# Patient Record
Sex: FEMALE | Race: WHITE | Employment: OTHER | URBAN - METROPOLITAN AREA
[De-identification: names, ages, dates, MRNs, and addresses within clinical notes are randomized per-mention and may not be internally consistent; named-entity substitution may affect disease eponyms.]

---

## 2017-08-10 ENCOUNTER — PATIENT OUTREACH (OUTPATIENT)
Dept: FAMILY MEDICINE CLINIC | Age: 67
End: 2017-08-10

## 2018-01-26 ENCOUNTER — APPOINTMENT (OUTPATIENT)
Dept: GENERAL RADIOLOGY | Age: 68
DRG: 872 | End: 2018-01-26
Attending: NURSE PRACTITIONER
Payer: MEDICARE

## 2018-01-26 ENCOUNTER — HOSPITAL ENCOUNTER (INPATIENT)
Age: 68
LOS: 2 days | Discharge: NURSING, REHAB OR LONG-TERM CARE | DRG: 872 | End: 2018-01-28
Attending: EMERGENCY MEDICINE | Admitting: INTERNAL MEDICINE
Payer: MEDICARE

## 2018-01-26 DIAGNOSIS — R53.83 LETHARGY: ICD-10-CM

## 2018-01-26 DIAGNOSIS — J10.1 INFLUENZA A: Primary | ICD-10-CM

## 2018-01-26 PROBLEM — J11.1 INFLUENZA: Status: ACTIVE | Noted: 2018-01-26

## 2018-01-26 PROBLEM — E11.9 TYPE 2 DIABETES MELLITUS WITHOUT COMPLICATION (HCC): Chronic | Status: ACTIVE | Noted: 2018-01-26

## 2018-01-26 LAB
ALBUMIN SERPL-MCNC: 3.1 G/DL (ref 3.5–5)
ALBUMIN/GLOB SERPL: 0.7 {RATIO} (ref 1.1–2.2)
ALP SERPL-CCNC: 84 U/L (ref 45–117)
ALT SERPL-CCNC: 20 U/L (ref 12–78)
ANION GAP SERPL CALC-SCNC: 9 MMOL/L (ref 5–15)
APPEARANCE UR: CLEAR
AST SERPL-CCNC: 31 U/L (ref 15–37)
ATRIAL RATE: 93 BPM
BACTERIA URNS QL MICRO: NEGATIVE /HPF
BASOPHILS # BLD: 0 K/UL (ref 0–0.1)
BASOPHILS NFR BLD: 0 % (ref 0–1)
BILIRUB SERPL-MCNC: 0.6 MG/DL (ref 0.2–1)
BILIRUB UR QL: NEGATIVE
BUN SERPL-MCNC: 10 MG/DL (ref 6–20)
BUN/CREAT SERPL: 18 (ref 12–20)
CALCIUM SERPL-MCNC: 8.9 MG/DL (ref 8.5–10.1)
CALCULATED R AXIS, ECG10: -57 DEGREES
CALCULATED T AXIS, ECG11: 129 DEGREES
CHLORIDE SERPL-SCNC: 101 MMOL/L (ref 97–108)
CO2 SERPL-SCNC: 26 MMOL/L (ref 21–32)
COLOR UR: ABNORMAL
CREAT SERPL-MCNC: 0.56 MG/DL (ref 0.55–1.02)
DIAGNOSIS, 93000: NORMAL
DIFFERENTIAL METHOD BLD: ABNORMAL
EOSINOPHIL # BLD: 0 K/UL (ref 0–0.4)
EOSINOPHIL NFR BLD: 0 % (ref 0–7)
EPITH CASTS URNS QL MICRO: ABNORMAL /LPF
ERYTHROCYTE [DISTWIDTH] IN BLOOD BY AUTOMATED COUNT: 14.8 % (ref 11.5–14.5)
FLUAV AG NPH QL IA: POSITIVE
FLUBV AG NOSE QL IA: NEGATIVE
GLOBULIN SER CALC-MCNC: 4.3 G/DL (ref 2–4)
GLUCOSE BLD STRIP.AUTO-MCNC: 198 MG/DL (ref 65–100)
GLUCOSE SERPL-MCNC: 273 MG/DL (ref 65–100)
GLUCOSE UR STRIP.AUTO-MCNC: 500 MG/DL
HCT VFR BLD AUTO: 32.9 % (ref 35–47)
HGB BLD-MCNC: 10.6 G/DL (ref 11.5–16)
HGB UR QL STRIP: ABNORMAL
HYALINE CASTS URNS QL MICRO: ABNORMAL /LPF (ref 0–5)
IMM GRANULOCYTES # BLD: 0.1 K/UL (ref 0–0.04)
IMM GRANULOCYTES NFR BLD AUTO: 1 % (ref 0–0.5)
KETONES UR QL STRIP.AUTO: ABNORMAL MG/DL
LEUKOCYTE ESTERASE UR QL STRIP.AUTO: NEGATIVE
LYMPHOCYTES # BLD: 0.6 K/UL (ref 0.8–3.5)
LYMPHOCYTES NFR BLD: 5 % (ref 12–49)
MCH RBC QN AUTO: 28.9 PG (ref 26–34)
MCHC RBC AUTO-ENTMCNC: 32.2 G/DL (ref 30–36.5)
MCV RBC AUTO: 89.6 FL (ref 80–99)
MONOCYTES # BLD: 0.5 K/UL (ref 0–1)
MONOCYTES NFR BLD: 4 % (ref 5–13)
NEUTS SEG # BLD: 10.5 K/UL (ref 1.8–8)
NEUTS SEG NFR BLD: 90 % (ref 32–75)
NITRITE UR QL STRIP.AUTO: NEGATIVE
NRBC # BLD: 0 K/UL (ref 0–0.01)
NRBC BLD-RTO: 0 PER 100 WBC
P-R INTERVAL, ECG05: 168 MS
PH UR STRIP: 6 [PH] (ref 5–8)
PLATELET # BLD AUTO: 236 K/UL (ref 150–400)
PMV BLD AUTO: 10.7 FL (ref 8.9–12.9)
POTASSIUM SERPL-SCNC: 4.8 MMOL/L (ref 3.5–5.1)
PROT SERPL-MCNC: 7.4 G/DL (ref 6.4–8.2)
PROT UR STRIP-MCNC: 30 MG/DL
Q-T INTERVAL, ECG07: 382 MS
QRS DURATION, ECG06: 102 MS
QTC CALCULATION (BEZET), ECG08: 474 MS
RBC # BLD AUTO: 3.67 M/UL (ref 3.8–5.2)
RBC #/AREA URNS HPF: ABNORMAL /HPF (ref 0–5)
RBC MORPH BLD: ABNORMAL
SERVICE CMNT-IMP: ABNORMAL
SODIUM SERPL-SCNC: 136 MMOL/L (ref 136–145)
SP GR UR REFRACTOMETRY: 1.02 (ref 1–1.03)
UR CULT HOLD, URHOLD: NORMAL
UROBILINOGEN UR QL STRIP.AUTO: 1 EU/DL (ref 0.2–1)
VENTRICULAR RATE, ECG03: 93 BPM
WBC # BLD AUTO: 11.7 K/UL (ref 3.6–11)
WBC URNS QL MICRO: ABNORMAL /HPF (ref 0–4)

## 2018-01-26 PROCEDURE — 80053 COMPREHEN METABOLIC PANEL: CPT | Performed by: NURSE PRACTITIONER

## 2018-01-26 PROCEDURE — 74011250636 HC RX REV CODE- 250/636: Performed by: NURSE PRACTITIONER

## 2018-01-26 PROCEDURE — 65270000029 HC RM PRIVATE

## 2018-01-26 PROCEDURE — 93005 ELECTROCARDIOGRAM TRACING: CPT

## 2018-01-26 PROCEDURE — 71045 X-RAY EXAM CHEST 1 VIEW: CPT

## 2018-01-26 PROCEDURE — 74011250636 HC RX REV CODE- 250/636: Performed by: INTERNAL MEDICINE

## 2018-01-26 PROCEDURE — 51701 INSERT BLADDER CATHETER: CPT

## 2018-01-26 PROCEDURE — 74011250637 HC RX REV CODE- 250/637: Performed by: INTERNAL MEDICINE

## 2018-01-26 PROCEDURE — 87804 INFLUENZA ASSAY W/OPTIC: CPT | Performed by: NURSE PRACTITIONER

## 2018-01-26 PROCEDURE — 36415 COLL VENOUS BLD VENIPUNCTURE: CPT | Performed by: NURSE PRACTITIONER

## 2018-01-26 PROCEDURE — 74011250637 HC RX REV CODE- 250/637: Performed by: NURSE PRACTITIONER

## 2018-01-26 PROCEDURE — 77030038269 HC DRN EXT URIN PURWCK BARD -A

## 2018-01-26 PROCEDURE — 85025 COMPLETE CBC W/AUTO DIFF WBC: CPT | Performed by: NURSE PRACTITIONER

## 2018-01-26 PROCEDURE — 99285 EMERGENCY DEPT VISIT HI MDM: CPT

## 2018-01-26 PROCEDURE — 81001 URINALYSIS AUTO W/SCOPE: CPT | Performed by: NURSE PRACTITIONER

## 2018-01-26 PROCEDURE — 77030011943

## 2018-01-26 PROCEDURE — 82962 GLUCOSE BLOOD TEST: CPT

## 2018-01-26 PROCEDURE — 96360 HYDRATION IV INFUSION INIT: CPT

## 2018-01-26 PROCEDURE — 74011636637 HC RX REV CODE- 636/637: Performed by: INTERNAL MEDICINE

## 2018-01-26 RX ORDER — ATORVASTATIN CALCIUM 20 MG/1
40 TABLET, FILM COATED ORAL
Status: DISCONTINUED | OUTPATIENT
Start: 2018-01-26 | End: 2018-01-28 | Stop reason: HOSPADM

## 2018-01-26 RX ORDER — ACETAMINOPHEN 325 MG/1
650 TABLET ORAL
Status: DISCONTINUED | OUTPATIENT
Start: 2018-01-26 | End: 2018-01-28 | Stop reason: HOSPADM

## 2018-01-26 RX ORDER — METFORMIN HYDROCHLORIDE 1000 MG/1
1000 TABLET ORAL 2 TIMES DAILY WITH MEALS
COMMUNITY

## 2018-01-26 RX ORDER — MEMANTINE HYDROCHLORIDE 10 MG/1
5 TABLET ORAL
Status: DISCONTINUED | OUTPATIENT
Start: 2018-01-26 | End: 2018-01-28 | Stop reason: HOSPADM

## 2018-01-26 RX ORDER — MEMANTINE HYDROCHLORIDE 10 MG/1
10 TABLET ORAL DAILY
COMMUNITY
Start: 2018-01-23 | End: 2018-01-29

## 2018-01-26 RX ORDER — INSULIN GLARGINE 100 [IU]/ML
10 INJECTION, SOLUTION SUBCUTANEOUS
Status: DISCONTINUED | OUTPATIENT
Start: 2018-01-26 | End: 2018-01-28 | Stop reason: HOSPADM

## 2018-01-26 RX ORDER — LISINOPRIL 5 MG/1
2.5 TABLET ORAL DAILY
Status: DISCONTINUED | OUTPATIENT
Start: 2018-01-27 | End: 2018-01-27

## 2018-01-26 RX ORDER — OXYCODONE AND ACETAMINOPHEN 5; 325 MG/1; MG/1
1 TABLET ORAL
Status: DISCONTINUED | OUTPATIENT
Start: 2018-01-26 | End: 2018-01-27

## 2018-01-26 RX ORDER — SODIUM CHLORIDE 0.9 % (FLUSH) 0.9 %
5-10 SYRINGE (ML) INJECTION AS NEEDED
Status: DISCONTINUED | OUTPATIENT
Start: 2018-01-26 | End: 2018-01-28 | Stop reason: HOSPADM

## 2018-01-26 RX ORDER — CARVEDILOL 3.12 MG/1
3.12 TABLET ORAL 2 TIMES DAILY WITH MEALS
Status: DISCONTINUED | OUTPATIENT
Start: 2018-01-27 | End: 2018-01-28 | Stop reason: HOSPADM

## 2018-01-26 RX ORDER — OSELTAMIVIR PHOSPHATE 75 MG/1
75 CAPSULE ORAL EVERY 12 HOURS
Status: DISCONTINUED | OUTPATIENT
Start: 2018-01-27 | End: 2018-01-28 | Stop reason: HOSPADM

## 2018-01-26 RX ORDER — SODIUM CHLORIDE 0.9 % (FLUSH) 0.9 %
5-10 SYRINGE (ML) INJECTION EVERY 8 HOURS
Status: DISCONTINUED | OUTPATIENT
Start: 2018-01-26 | End: 2018-01-28 | Stop reason: HOSPADM

## 2018-01-26 RX ORDER — KETOROLAC TROMETHAMINE 30 MG/ML
15 INJECTION, SOLUTION INTRAMUSCULAR; INTRAVENOUS
Status: DISCONTINUED | OUTPATIENT
Start: 2018-01-26 | End: 2018-01-26

## 2018-01-26 RX ORDER — CARVEDILOL 3.12 MG/1
3.12 TABLET ORAL 2 TIMES DAILY WITH MEALS
COMMUNITY

## 2018-01-26 RX ORDER — MEMANTINE HYDROCHLORIDE 10 MG/1
10 TABLET ORAL 2 TIMES DAILY
COMMUNITY
Start: 2018-01-30

## 2018-01-26 RX ORDER — DICLOFENAC SODIUM 10 MG/G
2 GEL TOPICAL DAILY
COMMUNITY

## 2018-01-26 RX ORDER — SODIUM CHLORIDE 9 MG/ML
75 INJECTION, SOLUTION INTRAVENOUS CONTINUOUS
Status: DISCONTINUED | OUTPATIENT
Start: 2018-01-26 | End: 2018-01-27

## 2018-01-26 RX ORDER — PROCHLORPERAZINE EDISYLATE 5 MG/ML
10 INJECTION INTRAMUSCULAR; INTRAVENOUS
Status: DISCONTINUED | OUTPATIENT
Start: 2018-01-26 | End: 2018-01-28 | Stop reason: HOSPADM

## 2018-01-26 RX ORDER — ACETAMINOPHEN 650 MG/1
975 SUPPOSITORY RECTAL
Status: COMPLETED | OUTPATIENT
Start: 2018-01-26 | End: 2018-01-26

## 2018-01-26 RX ORDER — HYDROMORPHONE HYDROCHLORIDE 2 MG/ML
0.5 INJECTION, SOLUTION INTRAMUSCULAR; INTRAVENOUS; SUBCUTANEOUS
Status: DISCONTINUED | OUTPATIENT
Start: 2018-01-26 | End: 2018-01-27

## 2018-01-26 RX ORDER — POTASSIUM CHLORIDE 750 MG/1
10 TABLET, FILM COATED, EXTENDED RELEASE ORAL
COMMUNITY

## 2018-01-26 RX ORDER — SCOLOPAMINE TRANSDERMAL SYSTEM 1 MG/1
1 PATCH, EXTENDED RELEASE TRANSDERMAL
Status: DISCONTINUED | OUTPATIENT
Start: 2018-01-26 | End: 2018-01-27

## 2018-01-26 RX ORDER — ONDANSETRON 4 MG/1
4 TABLET, FILM COATED ORAL
COMMUNITY

## 2018-01-26 RX ORDER — ATORVASTATIN CALCIUM 40 MG/1
40 TABLET, FILM COATED ORAL
COMMUNITY

## 2018-01-26 RX ORDER — MEMANTINE HYDROCHLORIDE 5 MG/1
5 TABLET ORAL
COMMUNITY
Start: 2018-01-23 | End: 2018-01-29

## 2018-01-26 RX ORDER — LISINOPRIL 2.5 MG/1
2.5 TABLET ORAL DAILY
COMMUNITY

## 2018-01-26 RX ORDER — OSELTAMIVIR PHOSPHATE 6 MG/ML
75 FOR SUSPENSION ORAL
Status: COMPLETED | OUTPATIENT
Start: 2018-01-26 | End: 2018-01-26

## 2018-01-26 RX ORDER — SCOLOPAMINE TRANSDERMAL SYSTEM 1 MG/1
1 PATCH, EXTENDED RELEASE TRANSDERMAL
COMMUNITY
End: 2018-01-28

## 2018-01-26 RX ORDER — INSULIN DEGLUDEC 100 U/ML
14 INJECTION, SOLUTION SUBCUTANEOUS
COMMUNITY

## 2018-01-26 RX ORDER — MEMANTINE HYDROCHLORIDE 10 MG/1
10 TABLET ORAL 2 TIMES DAILY
Status: DISCONTINUED | OUTPATIENT
Start: 2018-01-30 | End: 2018-01-28 | Stop reason: HOSPADM

## 2018-01-26 RX ORDER — MEMANTINE HYDROCHLORIDE 10 MG/1
10 TABLET ORAL DAILY
Status: DISCONTINUED | OUTPATIENT
Start: 2018-01-27 | End: 2018-01-28 | Stop reason: HOSPADM

## 2018-01-26 RX ADMIN — INSULIN GLARGINE 10 UNITS: 100 INJECTION, SOLUTION SUBCUTANEOUS at 21:50

## 2018-01-26 RX ADMIN — ACETAMINOPHEN 975 MG: 650 SUPPOSITORY RECTAL at 15:23

## 2018-01-26 RX ADMIN — Medication 10 ML: at 21:52

## 2018-01-26 RX ADMIN — SODIUM CHLORIDE 1000 ML: 9 INJECTION, SOLUTION INTRAVENOUS at 15:23

## 2018-01-26 RX ADMIN — MEMANTINE HYDROCHLORIDE 5 MG: 10 TABLET, FILM COATED ORAL at 21:51

## 2018-01-26 RX ADMIN — OSELTAMIVIR PHOSPHATE 75 MG: 6 POWDER, FOR SUSPENSION ORAL at 16:41

## 2018-01-26 RX ADMIN — ATORVASTATIN CALCIUM 40 MG: 20 TABLET, FILM COATED ORAL at 21:51

## 2018-01-26 RX ADMIN — SODIUM CHLORIDE 75 ML/HR: 9 INJECTION, SOLUTION INTRAVENOUS at 20:03

## 2018-01-26 NOTE — PROGRESS NOTES
[facility]: MED RECONCILIATION    Comments/Recommendations:    Med rec completed with call to [facility] and spoke with RN regarding medications. She states patient had AM meds today but vomited twice so she is unsure if patient actually received morning meds.  Patient is currently on a namenda taper. Patient is to take 10 mg q AM, 5 mg q PM from - then increase to 10 mg po BID on     Information obtained from: [facility] med list    Significant PMH/Disease States:   Past Medical History:   Diagnosis Date    Atrial fibrillation ([address]     discovered 3/1/15; spontaneously converted to NSR    C. difficile colitis     Cardiomyopathy     long history of cardiomyopathy     COPD     Depression     [facility]     DVT (deep venous thrombosis) ([facility]     Bilat; s/p IVC filter     H/O abdominal surgery     ?  Hypertension     Incontinence     CAM (obstructive sleep apnea)     not on CPAP    Prolonged QT interval 3/1/15     on 3/1/15     Seasonal allergies     Stroke [facility])     ; right sided weakness and aphasia (after major abd surgery)        Chief Complaint for this Admission:   Chief Complaint   Patient presents with    Vomiting    Lethargy       Allergies: Ciprofloxacin and Cleocin [clindamycin hcl]    Prior to Admission Medications:     Prior to Admission Medications   Prescriptions Last Dose Informant Patient Reported? Taking?   atorvastatin (LIPITOR) 40 mg tablet 2018 at PM Transfer Papers Yes Yes   Sig: Take 40 mg by mouth nightly. carvedilol (COREG) 3.125 mg tablet 2018 at AM Transfer Papers Yes Yes   Sig: Take 3.125 mg by mouth two (2) times daily (with meals). diclofenac (VOLTAREN) 1 % gel 2018 at AM Transfer Papers Yes Yes   Sig: Apply 2 g to affected area daily.  To right leg and right hip   insulin degludec (TRESIBA FLEXTOUCH U-100) 100 unit/mL (3 mL) inpn 2018 at PM Transfer Papers Yes Yes   Si Units by SubCUTAneous route nightly. lisinopril (PRINIVIL, ZESTRIL) 2.5 mg tablet 2018 at AM Transfer Papers Yes Yes   Sig: Take 2.5 mg by mouth daily. memantine (NAMENDA) 10 mg tablet  Transfer Papers Yes Yes   Sig: Take 10 mg by mouth two (2) times a day. Starting 18   memantine (NAMENDA) 10 mg tablet 2018 at AM Transfer Papers Yes Yes   Sig: Take 10 mg by mouth daily. 10 mg q AM, 5 mg q PM from - then increase to 10 mg po BID on    memantine (NAMENDA) 5 mg tablet 2018 at PM Transfer Papers Yes Yes   Sig: Take 5 mg by mouth nightly. 10 mg q AM, 5 mg q PM from - then increase to 10 mg po BID on    metFORMIN (GLUCOPHAGE) 1,000 mg tablet 2018 at AM Transfer Papers Yes Yes   Sig: Take 1,000 mg by mouth two (2) times daily (with meals). ondansetron hcl (ZOFRAN) 4 mg tablet  Transfer Papers Yes Yes   Sig: Take 4 mg by mouth every four (4) hours as needed for Nausea. potassium chloride SR (KLOR-CON 10) 10 mEq tablet 2018 at AM Transfer Papers Yes Yes   Sig: Take 10 mEq by mouth daily (with breakfast). rivaroxaban (XARELTO) 20 mg tab tablet 2018 at AM Transfer Papers Yes Yes   Sig: Take 20 mg by mouth daily (with breakfast). scopolamine (TRANSDERM-SCOP) 1 mg pt3d  Transfer Papers Yes Yes   Si Patch by TransDERmal route every seventy-two (72) hours.       Facility-Administered Medications: None         SAMI Garcia   Contact: [phone]

## 2018-01-26 NOTE — IP AVS SNAPSHOT
[address] 
[phone] Patient: Tanner Riojas MRN: [id number] FFT:[birth year] About your hospitalization You were admitted on:  January 26, 2018 You last received care in the:  [facility] 5M1 MED SURG 1 You were discharged on:  January 28, 2018 Why you were hospitalized Your primary diagnosis was: Influenza Your diagnoses also included:  A-Fib (Hcc), Copd (Chronic Obstructive Pulmonary Disease) (Hcc), Type 2 Diabetes Mellitus Without Complication (Hcc), Hyperlipidemia, Gerd (Gastroesophageal Reflux Disease), Seasonal Allergies, Paroxysmal Atrial Fibrillation (Hcc), Hypertension, Hx Of Deep Venous Thrombosis, Hx Of Completed Stroke, Dm Type 2 Causing Vascular Disease (Hcc), Depression, Dementia, Systolic And Diastolic Chf, Chronic (Hcc), Dino (Obstructive Sleep Apnea) Follow-up Information Follow up With Details Comments Contact Info  
 your usual Primary Doctor Schedule an appointment as soon as possible for a visit in 1 week [place] [address]   [address] [address] [phone] Discharge Orders None A check daina indicates which time of day the medication should be taken. My Medications START taking these medications Instructions Each Dose to Equal  
 Morning Noon Evening Bedtime  
 oseltamivir 75 mg capsule Commonly known as:  TAMIFLU Your last dose was: Your next dose is: Take 1 Cap by mouth every twelve (12) hours for 3 days. 75 mg CONTINUE taking these medications Instructions Each Dose to Equal  
 Morning Noon Evening Bedtime  
 carvedilol 3.125 mg tablet Commonly known as:  [address] Your last dose was: Your next dose is: Take 3.125 mg by mouth two (2) times daily (with meals). 3.125 mg  
    
   
   
   
  
 LIPITOR 40 mg tablet Generic drug:  atorvastatin Your last dose was: Your next dose is: Take 40 mg by mouth nightly. 40 mg  
    
   
   
   
  
 lisinopril 2.5 mg tablet Commonly known as:  Geovanni Chaudhary Your last dose was: Your next dose is: Take 2.5 mg by mouth daily. 2.5 mg  
    
   
   
   
  
 metFORMIN 1,000 mg tablet Commonly known as:  GLUCOPHAGE Your last dose was: Your next dose is: Take 1,000 mg by mouth two (2) times daily (with meals). 1000 mg  
    
   
   
   
  
 * memantine 10 mg tablet Commonly known as:  Clive Mac Your last dose was: Your next dose is: Take 10 mg by mouth daily. 10 mg q AM, 5 mg q PM from 1/23-1/29 then increase to 10 mg po BID on 1/30  
 10 mg  
    
   
   
   
  
 * memantine 5 mg tablet Commonly known as:  Clive Mac Your last dose was: Your next dose is: Take 5 mg by mouth nightly. 10 mg q AM, 5 mg q PM from 1/23-1/29 then increase to 10 mg po BID on 1/30  
 5 mg * NAMENDA 10 mg tablet Generic drug:  memantine Start taking on:  1/30/2018 Your last dose was: Your next dose is: Take 10 mg by mouth two (2) times a day. Starting 1/30/18  
 10 mg  
    
   
   
   
  
 ondansetron hcl 4 mg tablet Commonly known as:  Abdiaziz Tai Your last dose was: Your next dose is: Take 4 mg by mouth every four (4) hours as needed for Nausea. 4 mg  
    
   
   
   
  
 potassium chloride SR 10 mEq tablet Commonly known as:  KLOR-CON 10 Your last dose was: Your next dose is: Take 10 mEq by mouth daily (with breakfast). 10 mEq TRESIBA FLEXTOUCH U-100 100 unit/mL (3 mL) Inpn Generic drug:  insulin degludec Your last dose was: Your next dose is:    
   
   
 14 Units by SubCUTAneous route nightly. 14 Units VOLTAREN 1 % Gel Generic drug:  diclofenac Your last dose was: Your next dose is:    
   
   
 Apply 2 g to affected area daily. To right leg and right hip  
 2 g XARELTO 20 mg Tab tablet Generic drug:  rivaroxaban Your last dose was: Your next dose is: Take 20 mg by mouth daily (with breakfast). 20 mg  
    
   
   
   
  
 * Notice: This list has 3 medication(s) that are the same as other medications prescribed for you. Read the directions carefully, and ask your doctor or other care provider to review them with you. STOP taking these medications   
 scopolamine 1 mg Pt3d Commonly known as:  TRANSDERM-SCOP Where to Get Your Medications Information on where to get these meds will be given to you by the nurse or doctor. ! Ask your nurse or doctor about these medications  
  oseltamivir 75 mg capsule Discharge Instructions Patient Discharge Instructions Pattie [place] / [id number] : 1950 Admitted 2018 Discharged: 2018 Primary Diagnoses Problem List as of 2018  Date Reviewed: 2018 Codes Class Noted - Resolved Seasonal allergies Hypertension Hx of deep venous thrombosis DM type 2 causing vascular disease ([facility] Depression Dementia Systolic and diastolic CHF, chronic ([facility] * (Principal)Influenza Type 2 diabetes mellitus without complication (HCC) (Chronic) Paroxysmal atrial fibrillation (HCC) Hyperlipidemia (Chronic) COPD (chronic obstructive pulmonary disease) (HCC) (Chronic) GERD (gastroesophageal reflux disease) (Chronic) CAM (obstructive sleep apnea) (Chronic) Hx of completed stroke Take Home Medications · It is important that you take the medication exactly as they are prescribed.   
· Keep your medication in the bottles provided by the pharmacist and keep a list of the medication names, dosages, and times to be taken in your wallet. · Do not take other medications without consulting your doctor. What to do at [facility] Recommended diet: Regular Diet and Comfort feeding Recommended activity: Activity as tolerated and PT/OT Eval and Treat If you experience worse symptoms, please follow up with your PCP. Follow-up with your PCP in a few weeks Influenza (Flu): Care Instructions Your Care Instructions Influenza (flu) is an infection in the lungs and breathing passages. It is caused by the influenza virus. There are different strains, or types, of the flu virus from year to year. Unlike the common cold, the flu comes on suddenly and the symptoms, such as a cough, congestion, fever, chills, fatigue, aches, and pains, are more severe. These symptoms may last up to 10 days. Although the flu can make you feel very sick, it usually doesn't cause serious health problems. Home treatment is usually all you need for flu symptoms. But your doctor may prescribe antiviral medicine to prevent other health problems, such as pneumonia, from developing. Older people and those who have a long-term health condition, such as lung disease, are most at risk for having pneumonia or other health problems. Follow-up care is a key part of your treatment and safety. Be sure to make and go to all appointments, and call your doctor if you are having problems. It's also a good idea to know your test results and keep a list of the medicines you take. How can you care for yourself at home? · Get plenty of rest. 
· Drink plenty of fluids, enough so that your urine is light yellow or clear like water. If you have kidney, heart, or liver disease and have to limit fluids, talk with your doctor before you increase the amount of fluids you drink.  
· Take an over-the-counter pain medicine if needed, such as acetaminophen (Tylenol), ibuprofen (Advil, Motrin), or naproxen (Aleve), to relieve fever, headache, and muscle aches. Read and follow all instructions on the label. No one younger than 20 should take aspirin. It has been linked to Reye syndrome, a serious illness. · Do not smoke. Smoking can make the flu worse. If you need help quitting, talk to your doctor about stop-smoking programs and medicines. These can increase your chances of quitting for good. · Breathe moist air from a hot shower or from a sink filled with hot water to help clear a stuffy nose. · Before you use cough and cold medicines, check the label. These medicines may not be safe for young children or for people with certain health problems. · If the skin around your nose and lips becomes sore, put some petroleum jelly on the area. · To ease coughing: ¨ Drink fluids to soothe a scratchy throat. ¨ Suck on cough drops or plain hard candy. ¨ Take an over-the-counter cough medicine that contains dextromethorphan to help you get some sleep. Read and follow all instructions on the label. ¨ Raise your head at night with an extra pillow. This may help you rest if coughing keeps you awake. · Take any prescribed medicine exactly as directed. Call your doctor if you think you are having a problem with your medicine. To avoid spreading the flu · Wash your hands regularly, and keep your hands away from your face. · Stay home from school, work, and other public places until you are feeling better and your fever has been gone for at least 24 hours. The fever needs to have gone away on its own without the help of medicine. · Ask people living with you to talk to their doctors about preventing the flu. They may get antiviral medicine to keep from getting the flu from you. · To prevent the flu in the future, get a flu vaccine every fall. Encourage people living with you to get the vaccine. · Cover your mouth when you cough or sneeze. When should you call for help? Call 911 anytime you think you may need emergency care. For example, call if: 
? · You have severe trouble breathing. ?Call your doctor now or seek immediate medical care if: 
? · You have new or worse trouble breathing. ? · You seem to be getting much sicker. ? · You feel very sleepy or confused. ? · You have a new or higher fever. ? · You get a new rash. ? Watch closely for changes in your health, and be sure to contact your doctor if: 
? · You begin to get better and then get worse. ? · You are not getting better after 1 week. Where can you learn more? Go to http://keith-monica.info/. Enter K425 in the search box to learn more about \"Influenza (Flu): Care Instructions. \" Current as of: May 12, 2017 Content Version: 11.4 © 8634-4417 [employer]. Care instructions adapted under license by [employer] (which disclaims liability or warranty for this information). If you have questions about a medical condition or this instruction, always ask your healthcare professional. [place] [address] any warranty or liability for your use of this information. Information obtained by : 
I understand that if any problems occur once I am at home I am to contact my physician. I understand and acknowledge receipt of the instructions indicated above. Physician's or R.N.'s Signature                                                                  Date/Time Patient or Representative Signature                                                          Date/Time 
 
ACO Transitions of Care Introducing [employer] [address]rain offers a voluntary care coordination program to provide high quality service and care to [facility] fee-for-service beneficiaries. Madan Perry was designed to help you enhance your health and well-being through the following services: ? Transitions of Care  support for individuals who are transitioning from one care setting to another (example: Hospital to home). ? Chronic and Complex Care Coordination  support for individuals and caregivers of those with serious or chronic illnesses or with more than one chronic (ongoing) condition and those who take a number of different medications. If you meet specific medical criteria, a [address] [place] [address] Rd may call you directly to coordinate your care with your primary care physician and your other care providers. For questions about the [facility] programs, please, contact your physicians office. For general questions or additional information about Accountable Care Organizations: 
Please visit www.medicare.gov/acos. html or call 1-800-MEDICARE (1-560.966.3397) TTY users should call 6-926.890.5303. Jaxtr Announcement We are excited to announce that we are making your provider's discharge notes available to you in Jaxtr. You will see these notes when they are completed and signed by the physician that discharged you from your recent hospital stay. If you have any questions or concerns about any information you see in Jaxtr, please call the Health Information Department where you were seen or reach out to your Primary Care Provider for more information about your plan of care. Introducing [facility]! [facility] introduces Jaxtr patient portal. Now you can access parts of your medical record, email your doctor's office, and request medication refills online. 1. In your internet browser, go to [email or web address] 2. Click on the First Time User? Click Here link in the Sign In box. You will see the New Member Sign Up page. 3. Enter your Latinda Access Code exactly as it appears below. You will not need to use this code after youve completed the sign-up process. If you do not sign up before the expiration date, you must request a new code. · Latinda Access Code: [email or web address] Expires: 4/26/2018  2:41 PM 
 
4. Enter the last four digits of your Social Security Number (xxxx) and Date of Birth (mm/dd/yyyy) as indicated and click Submit. You will be taken to the next sign-up page. 5. Create a Latinda ID. This will be your Latinda login ID and cannot be changed, so think of one that is secure and easy to remember. 6. Create a Latinda password. You can change your password at any time. 7. Enter your Password Reset Question and Answer. This can be used at a later time if you forget your password. 8. Enter your e-mail address. You will receive e-mail notification when new information is available in [address] 9. Click Sign Up. You can now view and download portions of your medical record. 10. Click the Download Summary menu link to download a portable copy of your medical information. If you have questions, please visit the Frequently Asked Questions section of the Latinda website. Remember, Latinda is NOT to be used for urgent needs. For medical emergencies, dial 911. Now available from your iPhone and Android! Providers Seen During Your Hospitalization Provider Specialty Primary office phone Saad Morataya. Juan Tran, [address] [facility] Emergency Medicine [phone] Shana Erazo MD Internal Medicine [phone] Your Primary Care Physician (PCP) Primary Care Physician Office Phone Office Fax NONE ** None ** ** None ** You are allergic to the following Allergen Reactions Ciprofloxacin Unknown (comments) C-diff Cleocin (Clindamycin Hcl) Other (comments) c-diff Recent Documentation Height Weight BMI OB Status Smoking Status 1.626 m 60 kg 22.71 kg/m2 Postmenopausal Never Smoker Emergency Contacts Name Discharge Info Relation Home Work Mobile [address] CAREGIVER [3] Child [2] [phone] Debi Zaidi DISCHARGE CAREGIVER [3] Other Relative [6]   [phone] Patient Belongings The following personal items are in your possession at time of discharge: 
  Dental Appliances: None  Visual Aid: Glasses, With patient      Home Medications: None   Jewelry: None  Clothing: None    Other Valuables: None  Personal Items Sent to Safe:  (none) Please provide this summary of care documentation to your next provider. Signatures-by signing, you are acknowledging that this After Visit Summary has been reviewed with you and you have received a copy. Patient Signature:  ____________________________________________________________ Date:  ____________________________________________________________  
  
Lucrecia Deiters Provider Signature:  ____________________________________________________________ Date:  ____________________________________________________________

## 2018-01-26 NOTE — ED NOTES
Bedside and Verbal shift change report given to SERGEI Mauro (oncoming nurse) by Sarkis Ness (offgoing nurse). Report included the following information SBAR, Kardex, Procedure Summary, Intake/Output, MAR and Cardiac Rhythm sr.

## 2018-01-26 NOTE — ED NOTES
Spoke with provider, concerning code purple status. Criteria not met at this time; will wait for labs.

## 2018-01-26 NOTE — ED NOTES
TRANSFER - OUT REPORT:    Verbal report given to Teresa(name) on Precious Tijerina  being transferred to Medical(unit) for routine progression of care       Report consisted of patients Situation, Background, Assessment and   Recommendations(SBAR). Information from the following report(s) SBAR, Kardex, ED Summary and Recent Results was reviewed with the receiving nurse. Lines:   Peripheral IV 01/26/18 Left Hand (Active)   Site Assessment Clean, dry, & intact 1/26/2018  2:54 PM   Phlebitis Assessment 0 1/26/2018  2:54 PM   Dressing Status Clean, dry, & intact 1/26/2018  2:54 PM   Dressing Type Transparent 1/26/2018  2:54 PM   Hub Color/Line Status Pink 1/26/2018  2:54 PM   Alcohol Cap Used Yes 1/26/2018  2:54 PM        Opportunity for questions and clarification was provided.       Patient transported with:   [employer]

## 2018-01-26 NOTE — H&P
[address]  [phone]    Admission History and Physical      NAME:  Lynda Oconnor   :   1950   MRN:  [id number]     PCP:  None     Date/Time:  2018         Subjective:     CHIEF COMPLAINT: Lethargy     HISTORY OF PRESENT ILLNESS:     Ms. Jonathan Abad is a 74 y.o.  female who is admitted with influenza. Ms. Jonathan Abad presented to the Emergency Department this PM from [facility] with a report of lethargy:  Patient is unable to give me any history due to apparent dementia. SNF reported to the ED that she was more lethargic than usual.    History obtained from chart review and unobtainable from patient due to mental status and prior stroke with dementia. Previous records reviewed    Past Medical History:   Diagnosis Date    Atrial fibrillation ([facility] [address]     discovered 3/1/15; spontaneously converted to NSR    C. difficile colitis     Cardiomyopathy     long history of cardiomyopathy     COPD     Depression     Diabetes ([address]     DVT [facility]deep venous thrombosis) ([facility])     Bilat; s/p IVC filter     H/O abdominal surgery     ?     Hypertension     Incontinence     CAM (obstructive sleep apnea)     not on CPAP    Prolonged QT interval 3/1/15     on 3/1/15     Seasonal allergies     Stroke ([address]     ; right sided weakness and aphasia (after major abd surgery)         Past Surgical History:   Procedure Laterality Date    HX HEART CATHETERIZATION         Social History   Substance Use Topics    Smoking status: Never Smoker    Smokeless tobacco: Never Used    Alcohol use No        Family History   Problem Relation Age of Onset    Family history unknown: Yes    Patient cannot tell me, no family present    Allergies   Allergen Reactions    Ciprofloxacin Unknown (comments)     C-diff    Cleocin [Clindamycin Hcl] Other (comments)     c-diff        Prior to Admission medications    Medication Sig Start Date End Date Taking? Authorizing Provider   rivaroxaban (XARELTO) 15 mg tab tablet Take 1 Tab by mouth two (2) times daily (with meals). 3/5/15   Rufina Mcmanus MD   insulin glargine (LANTUS) 100 unit/mL injection 10 Units by SubCUTAneous route daily. 3/5/15   Rufina Mcmanus MD   furosemide (LASIX) 20 mg tablet Take 1 Tab by mouth daily as needed for Other (For leg swelling or SOB). 3/5/15   Rufina Mcmanus MD   potassium chloride 20 mEq TbER Take 20 mEq by mouth as needed (as needed along with lasix). 3/5/15   Rufina Mcmanus MD   FLUoxetine (PROZAC) 20 mg capsule Take 1 Cap by mouth daily. 3/5/15   Rufina Mcmanus MD   llaquyehbyg-[id number]-zkdiiirgc serr 1,500-400-100 mg-unit-mg tab Take 1 Tab by mouth daily.     Antonio Magdaleno MD         Review of Systems:  (bold if positive, if negative)    Unable to obtain         Objective:      VITALS:    Vital signs reviewed; most recent are:    Visit Vitals    /66    Pulse 93    Temp (!) 101.4 °F (38.6 °C)    Resp 18    Ht 5' 4\" (1.626 m)    Wt 73.9 kg (163 lb)    SpO2 97%    BMI 27.98 kg/m2     SpO2 Readings from Last 6 Encounters:   01/26/18 97%   03/05/15 99%        No intake or output data in the 24 hours ending 01/26/18 1722         Exam:     Physical Exam:    Gen: Well-developed, thin, frail, elderly, chronically ill-appearing, moaning repeatedly  HEENT:  Pink conjunctivae, PERRL, hearing intact to voice, moist mucous membranes  Neck: Supple, without masses, thyroid non-tender  Resp: No accessory muscle use, clear breath sounds without wheezes rales or rhonchi  Card: No murmurs, normal S1, S2 without thrills, bruits or peripheral edema, 2+ LE peripheral pulses  Abd:  Soft, non-tender, non-distended, normoactive bowel sounds are present, no palpable organomegaly and no detectable hernias  Lymph:  No cervical or inguinal adenopathy  Musc: No cyanosis or clubbing  Skin: No rashes or ulcers, skin turgor is good, cap refill <2 sec  Neuro:  Cranial nerves are grossly intact, no focal motor weakness, does not follow commands appropriately  Psych:  No insight, not oriented to person, place or time, alert but not interactive             Labs:    Recent Labs      01/26/18   1453   WBC  11.7*   HGB  10.6*   HCT  32.9*   PLT  236     Recent Labs      01/26/18   1453   NA  136   K  4.8   CL  101   CO2  26   GLU  273*   BUN  10   CREA  0.56   CA  8.9   ALB  3.1*   TBILI  0.6   SGOT  31   ALT  20     Lab Results   Component Value Date/Time    Glucose (POC) 244 03/05/2015 11:23 AM    Glucose (POC) 200 03/05/2015 07:14 AM     No results for input(s): PH, PCO2, PO2, HCO3, FIO2 in the last 72 hours. No results for input(s): INR in the last 72 hours. No lab exists for component: INREXT, INREXT    Additional testing:  Chest X-ray: chronic, stable, linear opacity at right base, no acute process, visualized by me.   Results not reviewed with Radiologist.       Assessment/Plan:       Principal Problem:    Influenza (1/26/2018)   - continue Tamiflu   - NOT septic, does meet SIRS criteria    Active Problems:    GERD (gastroesophageal reflux disease) (5/28/2010)   - continue PPI      Hyperlipidemia (7/23/2010)   - continue statin      COPD (chronic obstructive pulmonary disease) ([facility] [address] (7/23/2010)   - stable, no wheezing   - Pharmacy checking on meds, continue any respiratory meds      A-fib ([facility] [address] (3/2/2015), chronic   - on chronic anticoagulation with Xarelto and also has history of DVT   - continue Xarelto      Type 2 diabetes mellitus without complication ([facility] [address] (0/88/6814)   - follow BS on Lantus with SSI       Code status:   - patient is FULL CODE   - I found a reference to DNR status in a [facility] note from a prior admission, however I cannot find any DNR paperwork to confirm nor does the paperwork from [email or web address] indicate code status   - we have a financial POA on file here   - consult Palliative Care      Total time spent with patient: [address] [place] [address] discussed with: Patient, Family and Flavio Rojas NP    Discussed:  Code Status, Care Plan and D/C Planning    Prophylaxis:  Xarelto    Probable Disposition:  SNF/LTC           ___________________________________________________    Attending Physician: Felipe Noriega MD

## 2018-01-26 NOTE — ED PROVIDER NOTES
HPI Comments: 74 y.o. female with extensive past medical history, please see list, who presents from [facility] with chief complaint of lethargy. Per EMS, the NH staff say that the patient is more lethargic than normal.  Pt with a hx of CVA's and UTI's. There are no other acute medical concerns at this time. PCP: No primary care provider on file. Past Medical History:  No date: Atrial fibrillation [facility])      Comment: discovered 3/1/15; spontaneously converted to                NSR  No date: C. difficile colitis  No date: Cardiomyopathy      Comment: long history of cardiomyopathy   No date: COPD  No date: Depression  No date: Diabetes ([facility] [address]  No date: DVT (deep venous thrombosis) ([facility] [address]      Comment: Bilat; s/p IVC filter   No date: H/O abdominal surgery      Comment: 1992? No date: Hypertension  No date: Incontinence  No date: CAM (obstructive sleep apnea)      Comment: not on CPAP  3/1/15: Prolonged QT interval      Comment:  on 3/1/15   No date: Seasonal allergies  No date: Stroke [facility])      Comment: 1992; right sided weakness and aphasia (after                major abd surgery)       The history is provided by the EMS personnel and the nursing home. No  was used. Past Medical History:   Diagnosis Date    Atrial fibrillation ([address]     discovered 3/1/15; spontaneously converted to NSR    C. difficile colitis     Cardiomyopathy     long history of cardiomyopathy     COPD     Depression     Diabetes ([address]     DVT (deep venous thrombosis) ([facility])     Bilat; s/p IVC filter     H/O abdominal surgery     1992?     Hypertension     Incontinence     CAM (obstructive sleep apnea)     not on CPAP    Prolonged QT interval 3/1/15     on 3/1/15     Seasonal allergies     Stroke ([facility] [address]     1992; right sided weakness and aphasia (after major abd surgery)        Past Surgical History:   Procedure Laterality Date    HX HEART CATHETERIZATION           No family history on file.    Social History     Social History    Marital status:      Spouse name: N/A    Number of children: N/A    Years of education: N/A     Occupational History    Not on file. Social History Main Topics    Smoking status: Never Smoker    Smokeless tobacco: Never Used    Alcohol use No    Drug use: No    Sexual activity: Not on file     Other Topics Concern    Not on file     Social History Narrative         ALLERGIES: Ciprofloxacin and Cleocin [clindamycin hcl]    Review of Systems   Unable to perform ROS: Mental status change       Vitals:    01/26/18 1503 01/26/18 1510 01/26/18 1530 01/26/18 1700   BP:   116/88 128/66   Pulse:   93 93   Resp:   25 18   Temp: (!) 100.8 °F (38.2 °C) (!) 101.4 °F (38.6 °C)     SpO2:   100% 97%   Weight:       Height:                Physical Exam   Constitutional: She is oriented to person, place, and time. She appears well-developed and well-nourished. She appears lethargic. She is sleeping. Non-toxic appearance. She does not have a sickly appearance. She does not appear ill. HENT:   Head: Normocephalic and atraumatic. Right Ear: Hearing, tympanic membrane, external ear and ear canal normal.   Left Ear: Hearing, tympanic membrane, external ear and ear canal normal.   Nose: Nose normal.   Mouth/Throat: Mucous membranes are normal.   Eyes: Conjunctivae, EOM and lids are normal. Pupils are equal, round, and reactive to light. Neck: Trachea normal, normal range of motion and full passive range of motion without pain. Neck supple. Cardiovascular: Normal rate, regular rhythm, normal heart sounds and normal pulses. Pulmonary/Chest: Effort normal. No respiratory distress. She has decreased breath sounds in the right lower field and the left lower field. Abdominal: Soft. Normal appearance and bowel sounds are normal. There is tenderness in the suprapubic area. Musculoskeletal: Normal range of motion.    Neurological: She is oriented to person, place, and time. She has normal strength. She appears lethargic. GCS eye subscore is 4. GCS verbal subscore is 5. GCS motor subscore is 6. Skin: Skin is warm, dry and intact. Psychiatric: She has a normal mood and affect. Her speech is normal and behavior is normal. Judgment and thought content normal. Cognition and memory are normal.   Nursing note and vitals reviewed. [facility]  ED Course       Procedures     CONSULT NOTE:   4:09 PM  Shan BLAKELY spoke with [facility],   Specialty: FP  Discussed pt's hx, disposition, and available diagnostic and imaging results. Reviewed care plans. Consultant agrees with plans as outlined. Will admit. CONSULT NOTE:   4:35 PM  Thomas BLAKELY spoke with FP,   Specialty: FP  Discussed pt's hx, disposition, and available diagnostic and imaging results. Reviewed care plans. Consultant agrees with plans as outlined. FP states that the patient has not been seen in several years so they are turning down the admission. CONSULT NOTE:   5:08 PM  Shan BLAKELY spoke with Dr Ariel Mcmanus via [facility],   Specialty: Hospitalist  Discussed pt's hx, disposition, and available diagnostic and imaging results. Reviewed care plans. Consultant agrees with plans as outlined. Will Admit.      LABORATORY TESTS:  Recent Results (from the past 12 hour(s))   EKG, 12 LEAD, INITIAL    Collection Time: 01/26/18  2:36 PM   Result Value Ref Range    Ventricular Rate 93 BPM    Atrial Rate 93 BPM    P-R Interval 168 ms    QRS Duration 102 ms    Q-T Interval 382 ms    QTC Calculation (Bezet) 474 ms    Calculated R Axis -57 degrees    Calculated T Axis 129 degrees    Diagnosis       Normal sinus rhythm  Left axis deviation  Low voltage QRS  Cannot rule out Anteroseptal infarct (cited on or before 04-MAR-2015)  Abnormal ECG  When compared with ECG of 04-MAR-2015 14:59,  Nonspecific T wave abnormality no longer evident in Anterior leads     CBC WITH AUTOMATED DIFF    Collection Time: 01/26/18 2:53 PM   Result Value Ref Range    WBC 11.7 (H) 3.6 - 11.0 K/uL    RBC 3.67 (L) 3.80 - 5.20 M/uL    HGB 10.6 (L) 11.5 - 16.0 g/dL    HCT 32.9 (L) 35.0 - 47.0 %    MCV 89.6 80.0 - 99.0 FL    MCH 28.9 26.0 - 34.0 PG    MCHC 32.2 30.0 - 36.5 g/dL    RDW 14.8 (H) 11.5 - 14.5 %    PLATELET 000 398 - 688 K/uL    MPV 10.7 8.9 - 12.9 FL    NRBC 0.0 0  WBC    ABSOLUTE NRBC 0.00 0.00 - 0.01 K/uL    NEUTROPHILS 90 (H) 32 - 75 %    LYMPHOCYTES 5 (L) 12 - 49 %    MONOCYTES 4 (L) 5 - 13 %    EOSINOPHILS 0 0 - 7 %    BASOPHILS 0 0 - 1 %    IMMATURE GRANULOCYTES 1 (H) 0.0 - 0.5 %    ABS. NEUTROPHILS 10.5 (H) 1.8 - 8.0 K/UL    ABS. LYMPHOCYTES 0.6 (L) 0.8 - 3.5 K/UL    ABS. MONOCYTES 0.5 0.0 - 1.0 K/UL    ABS. EOSINOPHILS 0.0 0.0 - 0.4 K/UL    ABS. BASOPHILS 0.0 0.0 - 0.1 K/UL    ABS. IMM. GRANS. 0.1 (H) 0.00 - 0.04 K/UL    DF SMEAR SCANNED      RBC COMMENTS NORMOCYTIC, NORMOCHROMIC     METABOLIC PANEL, COMPREHENSIVE    Collection Time: 01/26/18  2:53 PM   Result Value Ref Range    Sodium 136 136 - 145 mmol/L    Potassium 4.8 3.5 - 5.1 mmol/L    Chloride 101 97 - 108 mmol/L    CO2 26 21 - 32 mmol/L    Anion gap 9 5 - 15 mmol/L    Glucose 273 (H) 65 - 100 mg/dL    BUN 10 6 - 20 MG/DL    Creatinine 0.56 0.55 - 1.02 MG/DL    BUN/Creatinine ratio 18 12 - 20      GFR est AA >60 >60 ml/min/1.73m2    GFR est non-AA >60 >60 ml/min/1.73m2    Calcium 8.9 8.5 - 10.1 MG/DL    Bilirubin, total 0.6 0.2 - 1.0 MG/DL    ALT (SGPT) 20 12 - 78 U/L    AST (SGOT) 31 15 - 37 U/L    Alk.  phosphatase 84 45 - 117 U/L    Protein, total 7.4 6.4 - 8.2 g/dL    Albumin 3.1 (L) 3.5 - 5.0 g/dL    Globulin 4.3 (H) 2.0 - 4.0 g/dL    A-G Ratio 0.7 (L) 1.1 - 2.2     INFLUENZA A & B AG (RAPID TEST)    Collection Time: 01/26/18  2:53 PM   Result Value Ref Range    Influenza A Antigen POSITIVE (A) NEG      Influenza B Antigen NEGATIVE  NEG     URINE CULTURE HOLD SAMPLE    Collection Time: 01/26/18  5:02 PM   Result Value Ref Range    Urine culture hold URINE ON HOLD IN MICROBIOLOGY DEPT FOR 3 DAYS         IMAGING RESULTS:    CT Results  (Last 48 hours)    None        PFT Results  (Last 48 hours)    None        Echo Results  (Last 48 hours)    None        CXR Results  (Last 48 hours)               01/26/18 1412  XR CHEST PORT Final result    Impression:  IMPRESSION: Right basilar subsegmental atelectasis versus scar. No acute process   seen. Narrative:  EXAM:  XR CHEST PORT       INDICATION:  increasing lethargy and vomiting today       COMPARISON:  3/2/2015       FINDINGS: A portable AP radiograph of the chest was obtained at 1358 hours. The   patient is on a cardiac monitor. There is a stable linear vertical oblique   opacity at the right base. The previously described airspace disease has   resolved. .  The cardiac and mediastinal contours and pulmonary vascularity are   stable. A posttraumatic exostosis is associated with the right proximal humeral   fracture. VENOUS DOPPLER results  (Last 48 hours)    None            MEDICATIONS GIVEN:  Medications   sodium chloride 0.9 % bolus infusion 1,000 mL (0 mL IntraVENous IV Completed 1/26/18 1644)   acetaminophen (TYLENOL) suppository 975 mg (975 mg Rectal Given 1/26/18 1523)   oseltamivir (TAMIFLU) 6 mg/mL oral suspension 75 mg (75 mg Oral Given 1/26/18 1641)       IMPRESSION:  1. Influenza A    2. Lethargy        PLAN:  1.  ADMIT

## 2018-01-26 NOTE — IP AVS SNAPSHOT
Summary of Care Report The Summary of Care report has been created to help improve care coordination. Users with access to [employer] or [address] (Web-based application) may access additional patient information including the Discharge Summary. If you are not currently a [address] user and need more information, please call the number listed below in the [place] [address] section and ask to be connected with Medical Records. Facility Information Name Address Phone [place] [id number] [phone] Patient Information Patient Name Sex CECY Katz ([id number]) Female 1950 Discharge Information Admitting Provider Service Area Unit Shana Erazo MD / [address] [facility]  [phone] Discharge Provider Discharge Date/Time Discharge Disposition Destination (none) 2018 (Pending) SNF (none) Patient Language Language ENGLISH [13] Hospital Problems as of 2018  Reviewed: 2018  8:31 AM by Shana Erazo MD  
  
  
  
 Class Noted - Resolved Last Modified POA Active Problems GERD (gastroesophageal reflux disease) (Chronic)  2010 - Present 2018 by Ivone Easley MD Yes Entered by Graciela Whitaker CAM (obstructive sleep apnea) (Chronic)  2010 - Present 2018 by Shana Erazo MD Yes Entered by Graciela Whitaker Overview Signed 2010 11:23 AM by Graciela Whitaker CPAP suggested Hyperlipidemia (Chronic)  2010 - Present 2018 by Ivone Easley MD Yes Entered by Lisette Mar COPD (chronic obstructive pulmonary disease) ([facility] (Chronic)  2010 - Present 2018 by Ivone Easley MD Yes Entered by Lisette Mar * (Principal)Influenza  1/26/2018 - Present 1/26/2018 by Karla Casiano MD Yes Entered by Karla Casiano MD  
  Type 2 diabetes mellitus without complication ([facility] (Chronic)  1/26/2018 - Present 1/26/2018 by Karla Casiano, MD Yes Entered by Karla Casiano MD  
  Seasonal allergies  Unknown - Present 1/27/2018 by Micheal Samples, MD Yes Entered by Micheal Rivas, MD  
  Paroxysmal atrial fibrillation ([facility]  1/1/2015 - Present 1/27/2018 by Micheal Samples, MD Yes Entered by Micheal Samples, MD  
  Hypertension  Unknown - Present 1/27/2018 by Micheal Samples, MD Yes Entered by Micheal Samples, MD  
  Hx of deep venous thrombosis  Unknown - Present 1/27/2018 by Micheal Samples, MD Yes Entered by Micheal Samples, MD  
  Overview Signed 1/27/2018  8:30 AM by Micheal Rivas, MD  
   bilateral, IVC filter Hx of completed stroke  1/1/1992 - Present 1/27/2018 by Micheal Samples, MD Yes Entered by Micheal Samples, MD  
  DM type 2 causing vascular disease [facility])  Unknown - Present 1/27/2018 by Micheal Samples, MD Yes Entered by Micheal Samples, MD  
  Depression  Unknown - Present 1/27/2018 by Micheal Samples, MD Yes Entered by Micheal Samples, MD  
  Dementia  Unknown - Present 1/27/2018 by Micheal Samples, MD Yes Entered by Micheal Samples, MD  
  Systolic and diastolic CHF, chronic [facility])  Unknown - Present 1/27/2018 by Micheal Rivas, MD Yes Entered by Micheal Rivas MD  
  
Non-Hospital Problems as of 1/28/2018  Reviewed: 1/27/2018  8:31 AM by Micheal Rivas MD  
 None You are allergic to the following Allergen Reactions Ciprofloxacin Unknown (comments) C-diff Cleocin (Clindamycin Hcl) Other (comments) c-diff Current Discharge Medication List  
  
START taking these medications Dose & Instructions Dispensing Information Comments  
 oseltamivir 75 mg capsule Commonly known as:  TAMIFLU Dose:  75 mg Take 1 Cap by mouth every twelve (12) hours for 3 days. Quantity:  6 Cap Refills:  0 CONTINUE these medications which have NOT CHANGED Dose & Instructions Dispensing Information Comments  
 carvedilol 3.125 mg tablet Commonly known as:  Isabel Serrano Dose:  3.125 mg Take 3.125 mg by mouth two (2) times daily (with meals). Refills:  0 LIPITOR 40 mg tablet Generic drug:  atorvastatin Dose:  40 mg Take 40 mg by mouth nightly. Refills:  0  
   
 lisinopril 2.5 mg tablet Commonly known as:  Geovanna Brands Dose:  2.5 mg Take 2.5 mg by mouth daily. Refills:  0  
   
 metFORMIN 1,000 mg tablet Commonly known as:  GLUCOPHAGE Dose:  1000 mg Take 1,000 mg by mouth two (2) times daily (with meals). Refills:  0  
   
 * memantine 10 mg tablet Commonly known as:  Jazmin Naas Dose:  10 mg Take 10 mg by mouth daily. 10 mg q AM, 5 mg q PM from 1/23-1/29 then increase to 10 mg po BID on 1/30 Refills:  0  
   
 * memantine 5 mg tablet Commonly known as:  Jazmin Naas Dose:  5 mg Take 5 mg by mouth nightly. 10 mg q AM, 5 mg q PM from 1/23-1/29 then increase to 10 mg po BID on 1/30 Refills:  0  
   
 * NAMENDA 10 mg tablet Generic drug:  memantine Start taking on:  1/30/2018 Dose:  10 mg Take 10 mg by mouth two (2) times a day. Starting 1/30/18 Refills:  0  
   
 ondansetron hcl 4 mg tablet Commonly known as:  Shaista Neno Dose:  4 mg Take 4 mg by mouth every four (4) hours as needed for Nausea. Refills:  0  
   
 potassium chloride SR 10 mEq tablet Commonly known as:  KLOR-CON 10 Dose:  10 mEq Take 10 mEq by mouth daily (with breakfast). Refills:  0  
   
 TRESIBA FLEXTOUCH U-100 100 unit/mL (3 mL) Inpn Generic drug:  insulin degludec Dose:  14 Units 14 Units by SubCUTAneous route nightly. Refills:  0 VOLTAREN 1 % Gel Generic drug:  diclofenac  Dose:  2 g  
 Apply 2 g to affected area daily. To right leg and right hip Refills:  0 XARELTO 20 mg Tab tablet Generic drug:  rivaroxaban Dose:  20 mg Take 20 mg by mouth daily (with breakfast). Refills:  0  
   
 * Notice: This list has 3 medication(s) that are the same as other medications prescribed for you. Read the directions carefully, and ask your doctor or other care provider to review them with you. STOP taking these medications Comments  
 scopolamine 1 mg Pt3d Commonly known as:  TRANSDERM-SCOP Current Immunizations Name Date Influenza Vaccine 2013 Pneumococcal Polysaccharide (PPSV-23) 2013 Pneumococcal Vaccine (Unspecified Type) 2007 Follow-up Information Follow up With Details Comments Contact Info  
 your usual Primary Doctor Schedule an appointment as soon as possible for a visit in 1 week [place] [address]   [address] [address] [address] [place],[address] [address] [phone] Discharge Instructions Patient Discharge Instructions Bienvenido Bridges / [id number] : 1950 Admitted 2018 Discharged: 2018 Primary Diagnoses Problem List as of 2018  Date Reviewed: 2018 Codes Class Noted - Resolved Seasonal allergies Hypertension Hx of deep venous thrombosis DM type 2 causing vascular disease ([facility] Depression Dementia Systolic and diastolic CHF, chronic ([facility] * (Principal)Influenza Type 2 diabetes mellitus without complication (HCC) (Chronic) Paroxysmal atrial fibrillation (HCC) Hyperlipidemia (Chronic) COPD (chronic obstructive pulmonary disease) (HCC) (Chronic) GERD (gastroesophageal reflux disease) (Chronic) CAM (obstructive sleep apnea) (Chronic) Hx of completed stroke Take Home Medications · It is important that you take the medication exactly as they are prescribed. · Keep your medication in the bottles provided by the pharmacist and keep a list of the medication names, dosages, and times to be taken in your wallet. · Do not take other medications without consulting your doctor. What to do at [facility] Recommended diet: Regular Diet and Comfort feeding Recommended activity: Activity as tolerated and PT/OT Eval and Treat If you experience worse symptoms, please follow up with your PCP. Follow-up with your PCP in a few weeks Influenza (Flu): Care Instructions Your Care Instructions Influenza (flu) is an infection in the lungs and breathing passages. It is caused by the influenza virus. There are different strains, or types, of the flu virus from year to year. Unlike the common cold, the flu comes on suddenly and the symptoms, such as a cough, congestion, fever, chills, fatigue, aches, and pains, are more severe. These symptoms may last up to 10 days. Although the flu can make you feel very sick, it usually doesn't cause serious health problems. Home treatment is usually all you need for flu symptoms. But your doctor may prescribe antiviral medicine to prevent other health problems, such as pneumonia, from developing. Older people and those who have a long-term health condition, such as lung disease, are most at risk for having pneumonia or other health problems. Follow-up care is a key part of your treatment and safety. Be sure to make and go to all appointments, and call your doctor if you are having problems. It's also a good idea to know your test results and keep a list of the medicines you take. How can you care for yourself at home? · Get plenty of rest. 
· Drink plenty of fluids, enough so that your urine is light yellow or clear like water. If you have kidney, heart, or liver disease and have to limit fluids, talk with your doctor before you increase the amount of fluids you drink. · Take an over-the-counter pain medicine if needed, such as acetaminophen (Tylenol), ibuprofen (Advil, Motrin), or naproxen (Aleve), to relieve fever, headache, and muscle aches. Read and follow all instructions on the label. No one younger than 20 should take aspirin. It has been linked to Reye syndrome, a serious illness. · Do not smoke. Smoking can make the flu worse. If you need help quitting, talk to your doctor about stop-smoking programs and medicines. These can increase your chances of quitting for good. · Breathe moist air from a hot shower or from a sink filled with hot water to help clear a stuffy nose. · Before you use cough and cold medicines, check the label. These medicines may not be safe for young children or for people with certain health problems. · If the skin around your nose and lips becomes sore, put some petroleum jelly on the area. · To ease coughing: ¨ Drink fluids to soothe a scratchy throat. ¨ Suck on cough drops or plain hard candy. ¨ Take an over-the-counter cough medicine that contains dextromethorphan to help you get some sleep. Read and follow all instructions on the label. ¨ Raise your head at night with an extra pillow. This may help you rest if coughing keeps you awake. · Take any prescribed medicine exactly as directed. Call your doctor if you think you are having a problem with your medicine. To avoid spreading the flu · Wash your hands regularly, and keep your hands away from your face. · Stay home from school, work, and other public places until you are feeling better and your fever has been gone for at least 24 hours. The fever needs to have gone away on its own without the help of medicine. · Ask people living with you to talk to their doctors about preventing the flu. They may get antiviral medicine to keep from getting the flu from you. · To prevent the flu in the future, get a flu vaccine every fall. Encourage people living with you to get the vaccine. · Cover your mouth when you cough or sneeze. When should you call for help? Call 911 anytime you think you may need emergency care. For example, call if: 
? · You have severe trouble breathing. ?Call your doctor now or seek immediate medical care if: 
? · You have new or worse trouble breathing. ? · You seem to be getting much sicker. ? · You feel very sleepy or confused. ? · You have a new or higher fever. ? · You get a new rash. ? Watch closely for changes in your health, and be sure to contact your doctor if: 
? · You begin to get better and then get worse. ? · You are not getting better after 1 week. Where can you learn more? Go to http://keith-monica.info/. Enter K276 in the search box to learn more about \"Influenza (Flu): Care Instructions. \" Current as of: May 12, 2017 Content Version: 11.4 © 5662-9375 [employer]. Care instructions adapted under license by [employer] (which disclaims liability or warranty for this information). If you have questions about a medical condition or this instruction, always ask your healthcare professional. [place] [address] any warranty or liability for your use of this information. Information obtained by : 
I understand that if any problems occur once I am at home I am to contact my physician. I understand and acknowledge receipt of the instructions indicated above. Physician's or R.N.'s Signature                                                                  Date/Time Patient or Representative Signature                                                          Date/Time Chart Review Routing History Recipient Method Report Sent By Don Martinez MD  
Phone: [phone] [place] [address] [[id number]] 3/31/2011  1:28 PM 03/31/2011

## 2018-01-26 NOTE — ED NOTES
Assumed care of patient. Introduced self as primary nurse using [address] [place] [address] Nw. Stretcher in low locked position with call bell within reach. Pt updated on plan of care and wait times. Pt instructed to use call bell if assistance is needed.

## 2018-01-26 NOTE — ED NOTES
Assumed care of pt, hunched over leaning towards right side. Pt alert and oriented to self only. Speech is slow, difficult to understand. Pt denies any pain; states \"Im tired and I want to go to sleep. \".

## 2018-01-26 NOTE — IP AVS SNAPSHOT
[address] 
[phone] Patient: Lynda Oconnor MRN: [id number] USQ:[birth year] A check daina indicates which time of day the medication should be taken. My Medications START taking these medications Instructions Each Dose to Equal  
 Morning Noon Evening Bedtime  
 oseltamivir 75 mg capsule Commonly known as:  TAMIFLU Your last dose was: Your next dose is: Take 1 Cap by mouth every twelve (12) hours for 3 days. 75 mg CONTINUE taking these medications Instructions Each Dose to Equal  
 Morning Noon Evening Bedtime  
 carvedilol 3.125 mg tablet Commonly known as:  Primitivo Smith Your last dose was: Your next dose is: Take 3.125 mg by mouth two (2) times daily (with meals). 3.125 mg  
    
   
   
   
  
 LIPITOR 40 mg tablet Generic drug:  atorvastatin Your last dose was: Your next dose is: Take 40 mg by mouth nightly. 40 mg  
    
   
   
   
  
 lisinopril 2.5 mg tablet Commonly known as:  Corinna Grebe Your last dose was: Your next dose is: Take 2.5 mg by mouth daily. 2.5 mg  
    
   
   
   
  
 metFORMIN 1,000 mg tablet Commonly known as:  GLUCOPHAGE Your last dose was: Your next dose is: Take 1,000 mg by mouth two (2) times daily (with meals). 1000 mg  
    
   
   
   
  
 * memantine 10 mg tablet Commonly known as:  Mouna Contreras Your last dose was: Your next dose is: Take 10 mg by mouth daily. 10 mg q AM, 5 mg q PM from 1/23-1/29 then increase to 10 mg po BID on 1/30  
 10 mg  
    
   
   
   
  
 * memantine 5 mg tablet Commonly known as:  Mouna Contreras Your last dose was: Your next dose is: Take 5 mg by mouth nightly.  10 mg q AM, 5 mg q PM from 1/23-1/29 then increase to 10 mg po BID on 1/30  
 5 mg * NAMENDA 10 mg tablet Generic drug:  memantine Start taking on:  1/30/2018 Your last dose was: Your next dose is: Take 10 mg by mouth two (2) times a day. Starting 1/30/18  
 10 mg  
    
   
   
   
  
 ondansetron hcl 4 mg tablet Commonly known as:  Ronnie Baralexandria Your last dose was: Your next dose is: Take 4 mg by mouth every four (4) hours as needed for Nausea. 4 mg  
    
   
   
   
  
 potassium chloride SR 10 mEq tablet Commonly known as:  KLOR-CON 10 Your last dose was: Your next dose is: Take 10 mEq by mouth daily (with breakfast). 10 mEq TRESIBA FLEXTOUCH U-100 100 unit/mL (3 mL) Inpn Generic drug:  insulin degludec Your last dose was: Your next dose is:    
   
   
 14 Units by SubCUTAneous route nightly. 14 Units VOLTAREN 1 % Gel Generic drug:  diclofenac Your last dose was: Your next dose is:    
   
   
 Apply 2 g to affected area daily. To right leg and right hip  
 2 g XARELTO 20 mg Tab tablet Generic drug:  rivaroxaban Your last dose was: Your next dose is: Take 20 mg by mouth daily (with breakfast). 20 mg  
    
   
   
   
  
 * Notice: This list has 3 medication(s) that are the same as other medications prescribed for you. Read the directions carefully, and ask your doctor or other care provider to review them with you. STOP taking these medications   
 scopolamine 1 mg Pt3d Commonly known as:  TRANSDERM-SCOP Where to Get Your Medications Information on where to get these meds will be given to you by the nurse or doctor. ! Ask your nurse or doctor about these medications  
  oseltamivir 75 mg capsule

## 2018-01-26 NOTE — ED NOTES
Attempted to obtain urine by straight cath. Two gtts obtained; not enough for urine specimen. Provider aware and orders received. Pt's brief was dry, but had soft bowel movement. Pt cleaned and new brief applied.

## 2018-01-26 NOTE — PROGRESS NOTES
1/26/2018   CARE MANAGEMENT NOTE:  CM is following pt in the ER for initial discharge planning. EMR reviewed. Per chart, pt presents to the ER from [facility]. She is Flu positive and will be admitted for further medical management. Full assessment not completed at this time. CM will assist with pt's return to [facility] or alternate discharge plan as appropriate.   Daniela

## 2018-01-27 LAB
ANION GAP SERPL CALC-SCNC: 7 MMOL/L (ref 5–15)
BUN SERPL-MCNC: 9 MG/DL (ref 6–20)
BUN/CREAT SERPL: 16 (ref 12–20)
CALCIUM SERPL-MCNC: 8.3 MG/DL (ref 8.5–10.1)
CHLORIDE SERPL-SCNC: 105 MMOL/L (ref 97–108)
CO2 SERPL-SCNC: 26 MMOL/L (ref 21–32)
CREAT SERPL-MCNC: 0.58 MG/DL (ref 0.55–1.02)
ERYTHROCYTE [DISTWIDTH] IN BLOOD BY AUTOMATED COUNT: 14.7 % (ref 11.5–14.5)
GLUCOSE BLD STRIP.AUTO-MCNC: 113 MG/DL (ref 65–100)
GLUCOSE BLD STRIP.AUTO-MCNC: 135 MG/DL (ref 65–100)
GLUCOSE BLD STRIP.AUTO-MCNC: 144 MG/DL (ref 65–100)
GLUCOSE BLD STRIP.AUTO-MCNC: 144 MG/DL (ref 65–100)
GLUCOSE SERPL-MCNC: 145 MG/DL (ref 65–100)
HCT VFR BLD AUTO: 31.1 % (ref 35–47)
HGB BLD-MCNC: 9.7 G/DL (ref 11.5–16)
MAGNESIUM SERPL-MCNC: 1.4 MG/DL (ref 1.6–2.4)
MCH RBC QN AUTO: 28.2 PG (ref 26–34)
MCHC RBC AUTO-ENTMCNC: 31.2 G/DL (ref 30–36.5)
MCV RBC AUTO: 90.4 FL (ref 80–99)
NRBC # BLD: 0 K/UL (ref 0–0.01)
NRBC BLD-RTO: 0 PER 100 WBC
PHOSPHATE SERPL-MCNC: 2.4 MG/DL (ref 2.6–4.7)
PLATELET # BLD AUTO: 220 K/UL (ref 150–400)
PMV BLD AUTO: 10 FL (ref 8.9–12.9)
POTASSIUM SERPL-SCNC: 3.6 MMOL/L (ref 3.5–5.1)
RBC # BLD AUTO: 3.44 M/UL (ref 3.8–5.2)
SERVICE CMNT-IMP: ABNORMAL
SODIUM SERPL-SCNC: 138 MMOL/L (ref 136–145)
WBC # BLD AUTO: 9.6 K/UL (ref 3.6–11)

## 2018-01-27 PROCEDURE — 97162 PT EVAL MOD COMPLEX 30 MIN: CPT

## 2018-01-27 PROCEDURE — 80048 BASIC METABOLIC PNL TOTAL CA: CPT | Performed by: INTERNAL MEDICINE

## 2018-01-27 PROCEDURE — 65270000029 HC RM PRIVATE

## 2018-01-27 PROCEDURE — 82962 GLUCOSE BLOOD TEST: CPT

## 2018-01-27 PROCEDURE — 84100 ASSAY OF PHOSPHORUS: CPT | Performed by: INTERNAL MEDICINE

## 2018-01-27 PROCEDURE — 97530 THERAPEUTIC ACTIVITIES: CPT

## 2018-01-27 PROCEDURE — 74011250637 HC RX REV CODE- 250/637: Performed by: INTERNAL MEDICINE

## 2018-01-27 PROCEDURE — 85027 COMPLETE CBC AUTOMATED: CPT | Performed by: INTERNAL MEDICINE

## 2018-01-27 PROCEDURE — 74011250636 HC RX REV CODE- 250/636: Performed by: INTERNAL MEDICINE

## 2018-01-27 PROCEDURE — 74011636637 HC RX REV CODE- 636/637: Performed by: INTERNAL MEDICINE

## 2018-01-27 PROCEDURE — 36415 COLL VENOUS BLD VENIPUNCTURE: CPT | Performed by: INTERNAL MEDICINE

## 2018-01-27 PROCEDURE — 83735 ASSAY OF MAGNESIUM: CPT | Performed by: INTERNAL MEDICINE

## 2018-01-27 RX ORDER — MAGNESIUM SULFATE 1 G/100ML
1 INJECTION INTRAVENOUS
Status: COMPLETED | OUTPATIENT
Start: 2018-01-27 | End: 2018-01-27

## 2018-01-27 RX ORDER — POTASSIUM CHLORIDE AND SODIUM CHLORIDE 900; 300 MG/100ML; MG/100ML
INJECTION, SOLUTION INTRAVENOUS CONTINUOUS
Status: DISCONTINUED | OUTPATIENT
Start: 2018-01-27 | End: 2018-01-28 | Stop reason: HOSPADM

## 2018-01-27 RX ADMIN — INSULIN GLARGINE 10 UNITS: 100 INJECTION, SOLUTION SUBCUTANEOUS at 21:15

## 2018-01-27 RX ADMIN — ACETAMINOPHEN 650 MG: 325 TABLET ORAL at 16:20

## 2018-01-27 RX ADMIN — MEMANTINE HYDROCHLORIDE 10 MG: 10 TABLET, FILM COATED ORAL at 09:14

## 2018-01-27 RX ADMIN — MAGNESIUM SULFATE IN DEXTROSE 1 G: 10 INJECTION, SOLUTION INTRAVENOUS at 07:40

## 2018-01-27 RX ADMIN — CARVEDILOL 3.12 MG: 3.12 TABLET, FILM COATED ORAL at 16:20

## 2018-01-27 RX ADMIN — MAGNESIUM SULFATE IN DEXTROSE 1 G: 10 INJECTION, SOLUTION INTRAVENOUS at 09:14

## 2018-01-27 RX ADMIN — OSELTAMIVIR PHOSPHATE 75 MG: 75 CAPSULE ORAL at 09:14

## 2018-01-27 RX ADMIN — SODIUM CHLORIDE AND POTASSIUM CHLORIDE: 9; 2.98 INJECTION, SOLUTION INTRAVENOUS at 21:28

## 2018-01-27 RX ADMIN — MEMANTINE HYDROCHLORIDE 5 MG: 10 TABLET, FILM COATED ORAL at 21:15

## 2018-01-27 RX ADMIN — ACETAMINOPHEN 650 MG: 325 TABLET ORAL at 02:13

## 2018-01-27 RX ADMIN — SODIUM CHLORIDE 75 ML/HR: 9 INJECTION, SOLUTION INTRAVENOUS at 05:23

## 2018-01-27 RX ADMIN — SODIUM CHLORIDE AND POTASSIUM CHLORIDE: 9; 2.98 INJECTION, SOLUTION INTRAVENOUS at 07:41

## 2018-01-27 RX ADMIN — ATORVASTATIN CALCIUM 40 MG: 20 TABLET, FILM COATED ORAL at 21:14

## 2018-01-27 RX ADMIN — OSELTAMIVIR PHOSPHATE 75 MG: 75 CAPSULE ORAL at 21:14

## 2018-01-27 NOTE — PROGRESS NOTES
Primary Nurse Paul Maravilla RN and Ezio Wolfe RN performed a dual skin assessment on this patient No impairment noted  Constantino score is 18

## 2018-01-27 NOTE — PROGRESS NOTES
Lantus 10 units sq ordered in [facility] but pt has no accucheck orders.  notified and ok to check blood sugar ac and hs.

## 2018-01-27 NOTE — CONSULTS
HISTORY OF PRESENTING ILLNESS      Hanny Lipscomb is a 74 y.o. female with history of cardiomyopathy, AF, HTN, DVT/IVC filter, DM admitted with lethargy found to be positive for influenzae. Cardiology consulted for assistance with cardiomyopathy. She has been started on Tamiflu. Patient/family previously refused further cardiac workup (ie [facility]) of her cardiomyopathy. She has historically been intolerance of beta blockers/ARBs due to hypotension. BP has been borderline. She is lethargic currently and was febrile last night.         ACTIVE PROBLEM LIST     Patient Active Problem List    Diagnosis Date Noted    Seasonal allergies     Hypertension     Hx of deep venous thrombosis     DM type 2 causing vascular disease ([facility]     Depression     Dementia     Systolic and diastolic CHF, chronic ([facility]     Influenza 01/26/2018    Type 2 diabetes mellitus without complication ([facility] 97/48/6945    Paroxysmal atrial fibrillation (HCC) 01/01/2015    Hyperlipidemia 07/23/2010    COPD (chronic obstructive pulmonary disease) ([facility] 07/23/2010    GERD (gastroesophageal reflux disease) 05/28/2010    CAM (obstructive sleep apnea) 05/28/2010    Hx of completed stroke 01/01/1992           MEDICATIONS     Current Facility-Administered Medications   Medication Dose Route Frequency    0.9% sodium chloride with KCl 40 mEq/L infusion   IntraVENous CONTINUOUS    oseltamivir (TAMIFLU) capsule 75 mg  75 mg Oral Q12H    sodium chloride (NS) flush 5-10 mL  5-10 mL IntraVENous Q8H    sodium chloride (NS) flush 5-10 mL  5-10 mL IntraVENous PRN    acetaminophen (TYLENOL) tablet 650 mg  650 mg Oral Q4H PRN    prochlorperazine (COMPAZINE) injection 10 mg  10 mg IntraVENous Q6H PRN    atorvastatin (LIPITOR) tablet 40 mg  40 mg Oral QHS    carvedilol (COREG) tablet 3.125 mg  3.125 mg Oral BID WITH MEALS    insulin glargine (LANTUS) injection 10 Units  10 Units SubCUTAneous QHS    [START ON 1/30/2018] memantine (NAMENDA) tablet 10 mg  10 mg Oral BID    memantine (NAMENDA) tablet 10 mg  10 mg Oral DAILY    memantine (NAMENDA) tablet 5 mg  5 mg Oral QHS           PAST MEDICAL HISTORY     Past Medical History:   Diagnosis Date    [facility]ch[facility] obstructive pulmonary disease) ([facility]     Dementia     Depression     DM type 2 causing vascular disease ([facility]     Hx of completed stroke 12    Hx of deep venous thrombosis     bilateral, IVC filter    Hypertension     Incontinence     CAM (obstructive sleep apnea)     not on CPAP    Paroxysmal atrial fibrillation ([facility] 2015    Seasonal allergies     Systolic and diastolic CHF, chronic ([facility]            PAST SURGICAL HISTORY     Past Surgical History:   Procedure Laterality Date    HX HEART CATHETERIZATION            ALLERGIES     Allergies   Allergen Reactions    Ciprofloxacin Unknown (comments)     C-diff    Cleocin [Clindamycin Hcl] Other (comments)     c-diff          FAMILY HISTORY     Family History   Problem Relation Age of Onset    Family history unknown: Yes    negative for cardiac disease       SOCIAL HISTORY     Social History     Social History    Marital status:      Spouse name: N/A    Number of children: N/A    Years of education: N/A     Social History Main Topics    Smoking status: Never Smoker    Smokeless tobacco: Never Used    Alcohol use No    Drug use: No    Sexual activity: Not Asked     Other Topics Concern    None     Social History Narrative         MEDICATIONS     Current Facility-Administered Medications   Medication Dose Route Frequency    0.9% sodium chloride with KCl 40 mEq/L infusion   IntraVENous CONTINUOUS    oseltamivir (TAMIFLU) capsule 75 mg  75 mg Oral Q12H    sodium chloride (NS) flush 5-10 mL  5-10 mL IntraVENous Q8H    sodium chloride (NS) flush 5-10 mL  5-10 mL IntraVENous PRN    acetaminophen (TYLENOL) tablet 650 mg  650 mg Oral Q4H PRN    prochlorperazine (COMPAZINE) injection 10 mg  10 mg IntraVENous Q6H PRN    atorvastatin (LIPITOR) tablet 40 mg  40 mg Oral QHS    carvedilol (COREG) tablet 3.125 mg  3.125 mg Oral BID WITH MEALS    insulin glargine (LANTUS) injection 10 Units  10 Units SubCUTAneous QHS    [START ON 1/30/2018] memantine (NAMENDA) tablet 10 mg  10 mg Oral BID    memantine (NAMENDA) tablet 10 mg  10 mg Oral DAILY    memantine (NAMENDA) tablet 5 mg  5 mg Oral QHS       I have reviewed the nurses notes, vitals, problem list, allergy list, medical history, family, social history and medications. REVIEW OF SYMPTOMS      General: confused; poor historian       PHYSICAL EXAMINATION      Vitals:    01/27/18 0204 01/27/18 0524 01/27/18 0715 01/27/18 1104   BP: 125/66 106/66 (!) 87/52 91/62   Pulse: 95 95 96 79   Resp: 17 18 19 14   Temp: (!) 101 °F (38.3 °C) 98.8 °F (37.1 °C) 99.2 °F (37.3 °C) 98.9 °F (37.2 °C)   SpO2: 92% 95% 96% 100%   Weight:       Height:         General: frail, elderly, lethargic female lying in fetal position in bed  HEENT: No jaundice, oral mucosa moist, no oral ulcers  Neck: Supple, no stiffness, no lymphadenopathy, supple  Heart:  Normal S1/S2 negative S3 or S4. Regular, no murmur, gallop or rub, no jugular venous distention  Respiratory: Clear bilaterally x 4, no wheezing or rales  Abdomen:   Soft, non-tender, bowel sounds are active.   Extremities:  No edema, normal cap refill, no cyanosis. Musculoskeletal: No clubbing, no deformities  Neuro: A&Ox3, speech clear, gait stable, cooperative, no focal neurologic deficits  Skin: Skin color is normal. No rashes or lesions.  Non diaphoretic, moist.  Vascular: 2+ pulses symmetric in all extremities       DIAGNOSTIC DATA      EKG: Sinus rhythm with LBBB, anterior MI (age indeterm)       LABORATORY DATA      Lab Results   Component Value Date/Time    WBC 9.6 01/27/2018 02:18 AM    HGB 9.7 01/27/2018 02:18 AM    HCT 31.1 01/27/2018 02:18 AM    PLATELET 454 11/57/3648 02:18 AM    MCV 90.4 01/27/2018 02:18 AM      Lab Results   Component Value Date/Time    Sodium 138 01/27/2018 02:18 AM    Potassium 3.6 01/27/2018 02:18 AM    Chloride 105 01/27/2018 02:18 AM    CO2 26 01/27/2018 02:18 AM    Anion gap 7 01/27/2018 02:18 AM    Glucose 145 01/27/2018 02:18 AM    BUN 9 01/27/2018 02:18 AM    Creatinine 0.58 01/27/2018 02:18 AM    BUN/Creatinine ratio 16 01/27/2018 02:18 AM    GFR est AA >60 01/27/2018 02:18 AM    GFR est non-AA >60 01/27/2018 02:18 AM    Calcium 8.3 01/27/2018 02:18 AM    Bilirubin, total 0.6 01/26/2018 02:53 PM    AST (SGOT) 31 01/26/2018 02:53 PM    Alk. phosphatase 84 01/26/2018 02:53 PM    Protein, total 7.4 01/26/2018 02:53 PM    Albumin 3.1 01/26/2018 02:53 PM    Globulin 4.3 01/26/2018 02:53 PM    A-G Ratio 0.7 01/26/2018 02:53 PM    ALT (SGPT) 20 01/26/2018 02:53 PM           ASSESSMENT      1. Cardiomyopathy, undetermined   A. LVEF 20%  2. Atrial fibrillation  3. DVT s/p IVC filter  4. Hx CVA  5. Dementia  6. CAM  7. Influenza+       PLAN     Given patient has been poorly tolerant of cardiac medications in the past, would only use diuretics if necessary. Patient/family refused cardiac evaluation ([facility]) in the past. No further cardiac testing at this time therefore. Will sign off for now.          Uriah Perea MD  Cardiac Electrophysiology / Cardiology    [address]  [address], [facility] [address].    Ladarius Peters  [phone] / [phone] Fax   [phone] / [phone] Fax

## 2018-01-27 NOTE — PROGRESS NOTES
Bedside and Verbal shift change report given to ROSANNA Bell RN (oncoming nurse) by Danice Sandhoff (offgoing nurse). Report included the following information SBAR and Kardex.

## 2018-01-27 NOTE — PROGRESS NOTES
Problem: Mobility Impaired (Adult and Pediatric)  Goal: *Therapy Goal (Edit Goal, Insert Text)  Physical Therapy Goals  Initiated 1/27/2018  1. Patient will move from supine to sit and sit to supine  in bed with minimal assistance/contact guard assist within 7 day(s). 2.  Patient will transfer from bed to chair and chair to bed with moderate assistance  using the least restrictive device within 7 day(s). 3.  Patient will perform sit to stand with minimal assistance/contact guard assist within 7 day(s). 4.  Patient will ambulate with moderate assistance  for 30 feet with the least restrictive device within 7 day(s). physical Therapy EVALUATION  Patient: Evaristo Herndon (30 y.o. female)  Date: 1/27/2018  Primary Diagnosis: Influenza        Precautions: Bed Alarm, Fall    ASSESSMENT :  Based on the objective data described below, the patient presents with h/o dementia and CVA with mild expressive aphasia. Pt has generalized weakness through out all extremities and requires mod/max assistance for bed mobility and transfers. Pt was admitted from [facility] for c/o increased [address]. Pt is very debilitated and will benefit with therapy for bed mobility and transfer training toward the progression to gait training in order to maximize functional independence. Patient will benefit from skilled intervention to address the above impairments.   Patients rehabilitation potential is considered to be Fair  Factors which may influence rehabilitation potential include:   []         None noted  [x]         Mental ability/status  [x]         Medical condition  []         Home/family situation and support systems  [x]         Safety awareness  []         Pain tolerance/management  []         Other:      PLAN :  Recommendations and Planned Interventions:  [x]           Bed Mobility Training             [x]    Neuromuscular Re-Education  [x]           Transfer Training                   []    Orthotic/Prosthetic Training  [x]           Gait Training                         []    Modalities  [x]           Therapeutic Exercises           []    Edema Management/Control  [x]           Therapeutic Activities            [x]    Patient and Family Training/Education  []           Other (comment):    Frequency/Duration: Patient will be followed by physical therapy  5 times a week to address goals. Discharge Recommendations: Kin King  Further Equipment Recommendations for Discharge: TBD     SUBJECTIVE:   Patient stated Pt with mild expressive aphasia and very difficult to understand speech. Marianna Almeida    OBJECTIVE DATA SUMMARY:   HISTORY:    Past Medical History:   Diagnosis Date    [facility]     Dementia     Depression     DM type 2 causing vascular disease ([facility]     Hx of completed stroke 12    Hx of deep venous thrombosis     bilateral, IVC filter    Hypertension     Incontinence     CAM (obstructive sleep apnea)     not on CPAP    [facility] 2015    Seasonal allergies     Systolic and diastolic CHF, chronic (HCC)      Past Surgical History:   Procedure Laterality Date    HX HEART CATHETERIZATION       Prior Level of Function/Home Situation: unknown but pt tried to say she walked with a RW. Personal factors and/or comorbidities impacting plan of care:     Home Situation  Home Environment: Skilled nursing facility ([facility])  [address] [place] [address] Name: Heidi Guardado  One/Two Story Residence: Other (Comment) (unknown)  Living Alone: No  Support Systems: Skilled nursing facility  Patient Expects to be Discharged to[de-identified] Skilled nursing facility  Current DME Used/Available at Home: winifred Morales    EXAMINATION/PRESENTATION/DECISION MAKING:   Critical Behavior:  Neurologic State: Alert, Confused  Orientation Level: Oriented to person, Oriented to place  Cognition: Poor safety awareness     Hearing:   Auditory  Auditory Impairment: None  Skin:  Age appropriate  Edema: none  Range Of Motion:  AROM: Generally decreased, functional           PROM: Generally decreased, functional           Strength:    Strength: Generally decreased, functional                    Tone & Sensation:   Tone: Normal              Sensation: Intact               Coordination:  Coordination: Generally decreased, functional  Vision:      Functional Mobility:  Bed Mobility:  Rolling: Moderate assistance  Supine to Sit: Moderate assistance  Sit to Supine: Maximum assistance  Scooting: Moderate assistance  Transfers:  Sit to Stand: Moderate assistance; Additional time;Assist x1  Stand to Sit: Minimum assistance  Stand Pivot Transfers: Maximum assistance  Stand Pivot Transfers: Maximum assistance  Bed to Chair: Maximum assistance              Balance:   Sitting: Intact; With support  Standing: Impaired; With support  Standing - Static: Poor  Standing - Dynamic : Poor  Ambulation/Gait Training:  Distance (ft): 8 Feet (ft)  Assistive Device: Walker, rolling  Ambulation - Level of Assistance: Moderate assistance; Additional time;Assist x1     Gait Description (WDL): Exceptions to WDL  Gait Abnormalities: Ataxic;Decreased step clearance        Base of Support: Narrowed     Speed/Susan: Delayed; Slow  Step Length: Left shortened;Right shortened                     Stairs:      Not assessed during this Rx session. Therapeutic Exercises:     Not done during this Rx session. Functional Measure:  Barthel Index:    Bathin  Bladder: 5  Bowels: 0  Groomin  Dressin  Feedin  Mobility: 5  Stairs: 0  Toilet Use: 0  Transfer (Bed to Chair and Back): 5  Total: 20       Barthel and G-code impairment scale:  Percentage of impairment CH  0% CI  1-19% CJ  20-39% CK  40-59% CL  60-79% CM  80-99% CN  100%   Barthel Score 0-100 100 99-80 79-60 59-40 20-39 1-19   0   Barthel Score 0-20 20 17-19 13-16 9-12 5-8 1-4 0      The Barthel ADL Index: Guidelines  1.  The index should be used as a record of what a patient does, not as a record of what a patient could do. 2. The main aim is to establish degree of independence from any help, physical or verbal, however minor and for whatever reason. 3. The need for supervision renders the patient not independent. 4. A patient's performance should be established using the best available evidence. Asking the patient, friends/relatives and nurses are the usual sources, but direct observation and common sense are also important. However direct testing is not needed. 5. Usually the patient's performance over the preceding 24-48 hours is important, but occasionally longer periods will be relevant. 6. Middle categories imply that the patient supplies over 50 per cent of the effort. 7. Use of aids to be independent is allowed. Paula Goodman., Barthel, D.W. (4030). Functional evaluation: the Barthel Index. [address] [facility] (14)2. Edy Crawford rashmi CHARISMA Rucker, Darren Rdz., Red Azul., [place], [address] (1999). Measuring the change indisability after inpatient rehabilitation; comparison of the responsiveness of the Barthel Index and Functional [place] Measure. Journal of Neurology, Neurosurgery, and Psychiatry, 66(4), 846-960. Cindi Johnson, N.J.A, FARHAN Ang.J.M, & Lorena Gardiner, M.A. (2004.) Assessment of post-stroke quality of life in cost-effectiveness studies: The usefulness of the Barthel Index and the EuroQoL-5D. Quality of Life Research, 13, 507-99       G codes: In compliance with CMSs Claims Based Outcome Reporting, the following G-code set was chosen for this patient based on their primary functional limitation being treated: The outcome measure chosen to determine the severity of the functional limitation was the Barthel Index with a score of 20/100 which was correlated with the impairment scale.     ? Mobility - Walking and Moving Around:     - CURRENT STATUS: CL - 60%-79% impaired, limited or restricted    - GOAL STATUS: CK - 40%-59% impaired, limited or restricted    - D/C STATUS:  ---------------To be determined---------------      Physical Therapy Evaluation Charge Determination   History Examination Presentation Decision-Making   MEDIUM  Complexity : 1-2 comorbidities / personal factors will impact the outcome/ POC  MEDIUM Complexity : 3 Standardized tests and measures addressing body structure, function, activity limitation and / or participation in recreation  MEDIUM Complexity : Evolving with changing characteristics  MEDIUM Complexity : FOTO score of 26-74      Based on the above components, the patient evaluation is determined to be of the following complexity level: MEDIUM    Pain:  Pain Scale 1: Numeric (0 - 10)  Pain Intensity 1: 0              Activity Tolerance:   Pt with generalized weakness and debilitation limited activity. Please refer to the flowsheet for vital signs taken during this treatment. After treatment:   []         Patient left in no apparent distress sitting up in chair  [x]         Patient left in no apparent distress in bed  [x]         Call bell left within reach  [x]         Nursing notified  []         Caregiver present  []         Bed alarm activated    COMMUNICATION/EDUCATION:   The patients plan of care was discussed with: Registered Nurse. [x]         Fall prevention education was provided and the patient/caregiver indicated understanding. [x]         Patient/family have participated as able in goal setting and plan of care. [x]         Patient/family agree to work toward stated goals and plan of care. []         Patient understands intent and goals of therapy, but is neutral about his/her participation. []         Patient is unable to participate in goal setting and plan of care.     Thank you for this referral.  Preston Montero, PT   Time Calculation: 34 mins

## 2018-01-27 NOTE — PROGRESS NOTES
Problem: Falls - Risk of  Goal: *Absence of Falls  Document Ivone Fall Risk and appropriate interventions in the flowsheet.    Outcome: Progressing Towards Goal  Fall Risk Interventions:  Mobility Interventions: Bed/chair exit alarm, Communicate number of staff needed for ambulation/transfer, OT consult for ADLs, Patient to call before getting OOB    Mentation Interventions: Bed/chair exit alarm, Door open when patient unattended, Reorient patient, Toileting rounds    Medication Interventions: Bed/chair exit alarm, Patient to call before getting OOB, Teach patient to arise slowly    Elimination Interventions: Bed/chair exit alarm, Call light in reach, Patient to call for help with toileting needs, Toilet paper/wipes in reach, Toileting schedule/hourly rounds

## 2018-01-27 NOTE — PROGRESS NOTES
Bedside and Verbal shift change report given to  Brian Byers (oncoming nurse) by Hari Cordova (offgoing nurse). Report included the following information SBAR.

## 2018-01-27 NOTE — PROGRESS NOTES
[facility] [place] [facility]  [address] [facility], [address] [place] [address]  [phone]      Medical Progress Note      NAME: Joseph Franz   :  1950  MRM:  [id number]    Date/Time: 2018  8:31 AM       Assessment and Plan:     Influenza - POA, high risk of deterioration due to underlying conditions. Tamiflu. Support. Droplet precautions. Palliative consult with family about goals of care. Sepsis / Leukocytosis / Sinus tachycardia - POA due to Flu. Did not have end organ damage, thus NOT severe sepsis. Doubt any bacterial infection. No Abx. Hydrate carefully, full sepsis bolus could be harmful. DM type 2 causing vascular disease - BG elevated due to illness. Diabetic diet and counseling. SSI per protocol. Continue home Lantus. Check A1c. Systolic and diastolic CHF, chronic / Hypotension / Hypertension - Hx of CHF with EF 34% and diastolic dysfunction. Watch fluid carefully as we hydrate for sepsis. Consult cards to co manage. High risk of death. Continue coreg, hold lisinopril    Dementia / Hx of completed stroke / Depression - Fall and aspiration precautions. PT/OT/Speech/Palliative consults. Continue memantine    Anemia - Moderate, and will worsen with hydration. Check serologies. Likely chronic disease. Stop xarelto    Hypomagnesemia - Replete and follow. COPD (chronic obstructive pulmonary disease) - Not on any meds. I am not sure about this Dx. CAM (obstructive sleep apnea) - Does not use CPAP    Hx of deep venous thrombosis - Hx bilateral, has IVC filter, hold xarelto. Paroxysmal atrial fibrillation - High fall risk and anemia. Currently NSR. Stop xarelto. GERD (gastroesophageal reflux disease) - PPI if symptoms. Hyperlipidemia - She is on Lipitor. Given overall condition, PCP may stop that.        Subjective:     Chief Complaint:  Groggy, sleepy, comfortable    ROS:  (bold if positive, if negative)      Tolerating minimal PT Tolerating some Diet        Objective:     Last 24hrs VS reviewed since prior progress note.  Most recent are:    Visit Vitals    BP (!) 87/52 (BP 1 Location: Right arm, BP Patient Position: At rest;Lying left side)    Pulse 96    Temp 99.2 °F (37.3 °C)    Resp 19    Ht 5' 4\" (1.626 m)    Wt 73.9 kg (163 lb)    SpO2 96%    BMI 27.98 kg/m2     SpO2 Readings from Last 6 Encounters:   01/27/18 96%   03/05/15 99%          Intake/Output Summary (Last 24 hours) at 01/27/18 0831  Last data filed at 01/27/18 5179   Gross per 24 hour   Intake              725 ml   Output                0 ml   Net              725 ml        Physical Exam:    Gen:  Frail, in no acute distress  HEENT:  Pink conjunctivae, PERRL, hearing intact to voice, moist mucous membranes  Neck:  Supple, without masses, thyroid non-tender  Resp:  No accessory muscle use, clear breath sounds without wheezes rales or rhonchi  Card:  No murmurs, tachycardic S1, S2 without thrills, bruits or peripheral edema  Abd:  Soft, non-tender, non-distended, normoactive bowel sounds are present, no mass  Lymph:  No cervical or inguinal adenopathy  Musc:  No cyanosis or clubbing  Skin:  No rashes or ulcers, skin turgor is reduced, normal pulses and adequate cap refill  Neuro:  Cranial nerves are grossly intact, general motor weakness, does not follow commands appropriately  Psych:  Poor insight, oriented to person    Telemetry reviewed:   normal sinus rhythm  __________________________________________________________________  Medications Reviewed: (see below)  Medications:     Current Facility-Administered Medications   Medication Dose Route Frequency    magnesium sulfate 1 g/100 ml IVPB (premix or compounded)  1 g IntraVENous Q1H    0.9% sodium chloride with KCl 40 mEq/L infusion   IntraVENous CONTINUOUS    oseltamivir (TAMIFLU) capsule 75 mg  75 mg Oral Q12H    sodium chloride (NS) flush 5-10 mL  5-10 mL IntraVENous Q8H    sodium chloride (NS) flush 5-10 mL  5-10 mL IntraVENous PRN    acetaminophen (TYLENOL) tablet 650 mg  650 mg Oral Q4H PRN    oxyCODONE-acetaminophen (PERCOCET) 5-325 mg per tablet 1 Tab  1 Tab Oral Q4H PRN    HYDROmorphone (DILAUDID) injection 0.5 mg  0.5 mg IntraVENous Q4H PRN    prochlorperazine (COMPAZINE) injection 10 mg  10 mg IntraVENous Q6H PRN    atorvastatin (LIPITOR) tablet 40 mg  40 mg Oral QHS    carvedilol (COREG) tablet 3.125 mg  3.125 mg Oral BID WITH MEALS    insulin glargine (LANTUS) injection 10 Units  10 Units SubCUTAneous QHS    lisinopril (PRINIVIL, ZESTRIL) tablet 2.5 mg  2.5 mg Oral DAILY    [START ON 1/30/2018] memantine (NAMENDA) tablet 10 mg  10 mg Oral BID    rivaroxaban (XARELTO) tablet 20 mg  20 mg Oral DAILY WITH BREAKFAST    scopolamine (TRANSDERM-SCOP) 1 mg 1 Patch  1 Patch TransDERmal Q72H    memantine (NAMENDA) tablet 10 mg  10 mg Oral DAILY    memantine (NAMENDA) tablet 5 mg  5 mg Oral QHS        Lab Data Reviewed: (see below)  Lab Review:     Recent Labs      01/27/18   0218  01/26/18   1453   WBC  9.6  11.7*   HGB  9.7*  10.6*   HCT  31.1*  32.9*   PLT  220  236     Recent Labs      01/27/18   0218  01/26/18   1453   NA  138  136   K  3.6  4.8   CL  105  101   CO2  26  26   GLU  145*  273*   BUN  9  10   CREA  0.58  0.56   CA  8.3*  8.9   MG  1.4*   --    PHOS  2.4*   --    ALB   --   3.1*   TBILI   --   0.6   SGOT   --   31   ALT   --   20     Lab Results   Component Value Date/Time    Glucose (POC) 144 01/27/2018 07:24 AM    Glucose (POC) 198 01/26/2018 09:32 PM    Glucose (POC) 244 03/05/2015 11:23 AM    Glucose (POC) 200 03/05/2015 07:14 AM    Glucose (POC) 188 03/04/2015 09:01 PM     No results for input(s): PH, PCO2, PO2, HCO3, FIO2 in the last 72 hours. No results for input(s): INR in the last 72 hours.     No lab exists for component: INREXT  All Micro Results     Procedure Component Value Units Date/Time    URINE CULTURE HOLD SAMPLE [[id number]] Collected:  01/26/18 1702    Order Status:  Completed Specimen:  Urine from Serum Updated:  01/26/18 1708     Urine culture hold         URINE ON HOLD IN MICROBIOLOGY DEPT FOR 3 DAYS    INFLUENZA A & B AG (RAPID TEST) [[id number]]  (Abnormal) Collected:  01/26/18 1453    Order Status:  Completed Specimen:  Nasopharyngeal from Nasal washing Updated:  01/26/18 1544     Influenza A Antigen POSITIVE (A)        Influenza B Antigen NEGATIVE              I have reviewed notes of prior 24hr.     Other pertinent lab: none    Total time spent with patient: [address] [place] [address] discussed with: Patient, Care Manager, Nursing Staff and >50% of time spent in counseling and coordination of care    Discussed:  Care Plan    Prophylaxis:  H2B/PPI    Disposition:  SNF/LTC           ___________________________________________________    Attending Physician: Meghann Flores MD

## 2018-01-28 VITALS
OXYGEN SATURATION: 98 % | RESPIRATION RATE: 17 BRPM | HEIGHT: 64 IN | HEART RATE: 68 BPM | DIASTOLIC BLOOD PRESSURE: 80 MMHG | TEMPERATURE: 98.5 F | BODY MASS INDEX: 22.58 KG/M2 | SYSTOLIC BLOOD PRESSURE: 137 MMHG | WEIGHT: 132.28 LBS

## 2018-01-28 LAB
ANION GAP SERPL CALC-SCNC: 7 MMOL/L (ref 5–15)
BASOPHILS # BLD: 0 K/UL (ref 0–0.1)
BASOPHILS NFR BLD: 0 % (ref 0–1)
BUN SERPL-MCNC: 7 MG/DL (ref 6–20)
BUN/CREAT SERPL: 13 (ref 12–20)
CALCIUM SERPL-MCNC: 8 MG/DL (ref 8.5–10.1)
CHLORIDE SERPL-SCNC: 107 MMOL/L (ref 97–108)
CHOLEST SERPL-MCNC: 93 MG/DL
CO2 SERPL-SCNC: 27 MMOL/L (ref 21–32)
CREAT SERPL-MCNC: 0.55 MG/DL (ref 0.55–1.02)
DIFFERENTIAL METHOD BLD: ABNORMAL
EOSINOPHIL # BLD: 0.1 K/UL (ref 0–0.4)
EOSINOPHIL NFR BLD: 1 % (ref 0–7)
ERYTHROCYTE [DISTWIDTH] IN BLOOD BY AUTOMATED COUNT: 14.6 % (ref 11.5–14.5)
FERRITIN SERPL-MCNC: 54 NG/ML (ref 8–252)
FOLATE SERPL-MCNC: 19 NG/ML (ref 5–21)
GLUCOSE BLD STRIP.AUTO-MCNC: 97 MG/DL (ref 65–100)
GLUCOSE SERPL-MCNC: 105 MG/DL (ref 65–100)
HCT VFR BLD AUTO: 29 % (ref 35–47)
HDLC SERPL-MCNC: 50 MG/DL
HDLC SERPL: 1.9 {RATIO} (ref 0–5)
HGB BLD-MCNC: 9.1 G/DL (ref 11.5–16)
IMM GRANULOCYTES # BLD: 0 K/UL (ref 0–0.04)
IMM GRANULOCYTES NFR BLD AUTO: 0 % (ref 0–0.5)
IRON SATN MFR SERPL: 3 % (ref 20–50)
IRON SERPL-MCNC: 8 UG/DL (ref 35–150)
LDH SERPL L TO P-CCNC: 193 U/L (ref 81–246)
LDLC SERPL CALC-MCNC: 32.4 MG/DL (ref 0–100)
LIPID PROFILE,FLP: NORMAL
LYMPHOCYTES # BLD: 1.2 K/UL (ref 0.8–3.5)
LYMPHOCYTES NFR BLD: 22 % (ref 12–49)
MAGNESIUM SERPL-MCNC: 2.1 MG/DL (ref 1.6–2.4)
MCH RBC QN AUTO: 28.7 PG (ref 26–34)
MCHC RBC AUTO-ENTMCNC: 31.4 G/DL (ref 30–36.5)
MCV RBC AUTO: 91.5 FL (ref 80–99)
MONOCYTES # BLD: 0.4 K/UL (ref 0–1)
MONOCYTES NFR BLD: 8 % (ref 5–13)
NEUTS SEG # BLD: 3.8 K/UL (ref 1.8–8)
NEUTS SEG NFR BLD: 69 % (ref 32–75)
NRBC # BLD: 0 K/UL (ref 0–0.01)
NRBC BLD-RTO: 0 PER 100 WBC
PLATELET # BLD AUTO: 194 K/UL (ref 150–400)
PMV BLD AUTO: 10.1 FL (ref 8.9–12.9)
POTASSIUM SERPL-SCNC: 4.2 MMOL/L (ref 3.5–5.1)
RBC # BLD AUTO: 3.17 M/UL (ref 3.8–5.2)
RETICS # AUTO: 0.03 M/UL (ref 0.02–0.08)
RETICS/RBC NFR AUTO: 0.9 % (ref 0.7–2.1)
SERVICE CMNT-IMP: NORMAL
SODIUM SERPL-SCNC: 141 MMOL/L (ref 136–145)
TIBC SERPL-MCNC: 246 UG/DL (ref 250–450)
TRIGL SERPL-MCNC: 53 MG/DL (ref ?–150)
TSH SERPL DL<=0.05 MIU/L-ACNC: 2.54 UIU/ML (ref 0.36–3.74)
VIT B12 SERPL-MCNC: 286 PG/ML (ref 211–911)
VLDLC SERPL CALC-MCNC: 10.6 MG/DL
WBC # BLD AUTO: 5.6 K/UL (ref 3.6–11)

## 2018-01-28 PROCEDURE — 83550 IRON BINDING TEST: CPT | Performed by: INTERNAL MEDICINE

## 2018-01-28 PROCEDURE — 83615 LACTATE (LD) (LDH) ENZYME: CPT | Performed by: INTERNAL MEDICINE

## 2018-01-28 PROCEDURE — 85045 AUTOMATED RETICULOCYTE COUNT: CPT | Performed by: INTERNAL MEDICINE

## 2018-01-28 PROCEDURE — 74011250637 HC RX REV CODE- 250/637: Performed by: INTERNAL MEDICINE

## 2018-01-28 PROCEDURE — 80061 LIPID PANEL: CPT | Performed by: INTERNAL MEDICINE

## 2018-01-28 PROCEDURE — 82728 ASSAY OF FERRITIN: CPT | Performed by: INTERNAL MEDICINE

## 2018-01-28 PROCEDURE — 82607 VITAMIN B-12: CPT | Performed by: INTERNAL MEDICINE

## 2018-01-28 PROCEDURE — 84443 ASSAY THYROID STIM HORMONE: CPT | Performed by: INTERNAL MEDICINE

## 2018-01-28 PROCEDURE — 82962 GLUCOSE BLOOD TEST: CPT

## 2018-01-28 PROCEDURE — 82746 ASSAY OF FOLIC ACID SERUM: CPT | Performed by: INTERNAL MEDICINE

## 2018-01-28 PROCEDURE — 36415 COLL VENOUS BLD VENIPUNCTURE: CPT | Performed by: INTERNAL MEDICINE

## 2018-01-28 PROCEDURE — 85025 COMPLETE CBC W/AUTO DIFF WBC: CPT | Performed by: INTERNAL MEDICINE

## 2018-01-28 PROCEDURE — 83735 ASSAY OF MAGNESIUM: CPT | Performed by: INTERNAL MEDICINE

## 2018-01-28 PROCEDURE — 80048 BASIC METABOLIC PNL TOTAL CA: CPT | Performed by: INTERNAL MEDICINE

## 2018-01-28 RX ORDER — OSELTAMIVIR PHOSPHATE 75 MG/1
75 CAPSULE ORAL EVERY 12 HOURS
Qty: 6 CAP | Refills: 0 | Status: SHIPPED | OUTPATIENT
Start: 2018-01-28 | End: 2018-01-31

## 2018-01-28 RX ADMIN — CARVEDILOL 3.12 MG: 3.12 TABLET, FILM COATED ORAL at 10:57

## 2018-01-28 RX ADMIN — MEMANTINE HYDROCHLORIDE 10 MG: 10 TABLET, FILM COATED ORAL at 10:56

## 2018-01-28 RX ADMIN — OSELTAMIVIR PHOSPHATE 75 MG: 75 CAPSULE ORAL at 10:56

## 2018-01-28 NOTE — PROGRESS NOTES
TRANSFER - OUT REPORT:    Verbal report given to Tea BETANCUR LPN(name) on Alfa Wright  being transferred to [facility]) for routine progression of care/discharge. Report consisted of patients Situation, Background, Assessment and   Recommendations(SBAR). Information from the following report(s) SBAR, Kardex, Intake/Output, MAR and Recent Results was reviewed with the receiving nurse. Lines:   Peripheral IV 01/28/18 Left Forearm (Active)   Site Assessment Clean, dry, & intact 1/28/2018  2:53 AM   Phlebitis Assessment 0 1/28/2018  2:53 AM   Infiltration Assessment 0 1/28/2018  2:53 AM   Dressing Status New 1/28/2018  2:53 AM   Dressing Type Transparent 1/28/2018  2:53 AM   Hub Color/Line Status Blue;End cap changed; Flushed;Patent 1/28/2018  2:53 AM   Action Taken Blood drawn 1/28/2018  2:53 AM   Alcohol Cap Used Yes 1/28/2018  2:53 AM        Opportunity for questions and clarification was provided.       Patient transported with:   Tech/EMS transport to facility

## 2018-01-28 NOTE — DISCHARGE SUMMARY
Physician Discharge Summary     Patient ID:  Lynda Oconnor  [id number]  74 y.o.  1950    Admit date: 1/26/2018    Discharge date and time: 1/28/2018    Admission Diagnoses: Influenza    Discharge Diagnoses:    Principal Diagnosis   Influenza                                             Other Diagnoses    GERD (gastroesophageal reflux disease) (5/28/2010)    CAM (obstructive sleep apnea) (5/28/2010)    Hyperlipidemia (7/23/2010)    COPD (chronic obstructive pulmonary disease) ([facility] (7/23/2010)    Type 2 diabetes mellitus without complication ([facility] (7/89/2354)    Seasonal allergies ()    Paroxysmal atrial fibrillation ([facility] (1/1/2015)    Hypertension ()    Hx of deep venous thrombosis ()    Hx of completed stroke (1/1/1992)    DM type 2 causing vascular disease (HCC) ()    Depression ()    Dementia ()    Systolic and diastolic CHF, chronic ([facility] ()    Hospital Course:  Influenza - POA, we had feared high risk of deterioration due to underlying conditions, but she appears better. Complete Tamiflu. Droplet precautions.       Sepsis / Leukocytosis / Sinus tachycardia - POA due to Flu. She did not have end organ damage, thus NOT severe sepsis. Doubt any bacterial infection. No Abx. Hydrated carefully, full sepsis bolus could be harmful.     DM type 2 causing vascular disease - BG elevated initially due to illness, now better. Diabetic diet and counseling. SSI per protocol. Continue home Lantus. Check R3F.     Systolic and diastolic CHF, chronic / Hypotension / Hypertension - Hx of CHF with EF 77% and diastolic dysfunction. Watch fluid carefully as we hydrate for sepsis. Consulted cards to co manage, but patient and family want no further workup. High risk of death, yet she remains full code per our records. Continue coreg, hold lisinopril due to hypotension. Palliative consult with family about goals of care.     Dementia / Hx of completed stroke / Depression - Fall and aspiration precautions. PT/OT/Speech/Palliative consults. Ready to return to rehab if they can take her. Continue memantine     Anemia - Moderate, and will worsen with hydration. Check serologies. Likely chronic disease. I would recommend PCP stop xarelto     Hypomagnesemia - Replete and follow.     COPD (chronic obstructive pulmonary disease) - Not on any meds. I am not sure about this Dx.     CAM (obstructive sleep apnea) - Does not use CPAP     Hx of deep venous thrombosis - Hx bilateral, has IVC filter, held xarelto, PCP to consider stopping it.     Paroxysmal atrial fibrillation - High fall risk and anemia. Currently NSR. PCP to consider stopping xarelto.     GERD (gastroesophageal reflux disease) - PPI if symptoms.     Hyperlipidemia - She is on Lipitor. Given overall poor condition, PCP may want to stop that.     PCP: None    Consults: Cardiology    Significant Diagnostic Studies: See Hospital Course    Discharged to SNF in improved condition. Discharge Exam:   /64 (BP 1 Location: Right arm, BP Patient Position: At rest)    Pulse 66    Temp 98.1 °F (36.7 °C)    Resp 17    Ht 5' 4\" (1.626 m)    Wt 60 kg (132 lb 4.4 oz)    SpO2 99%    BMI 22.71 kg/m2      Gen:  Frail, in no acute distress  HEENT:  Pink conjunctivae, PERRL, hearing intact to voice, moist mucous membranes  Neck:  Supple, without masses, thyroid non-tender  Resp:  No accessory muscle use, clear breath sounds without wheezes rales or rhonchi  Card:  No murmurs, tachycardic S1, S2 without thrills, bruits or peripheral edema  Abd:  Soft, non-tender, non-distended, normoactive bowel sounds are present, no mass  Lymph:  No cervical or inguinal adenopathy  Musc:  No cyanosis or clubbing  Skin:  No rashes or ulcers, skin turgor is reduced, normal pulses and adequate cap refill  Neuro:  Cranial nerves are grossly intact, general motor weakness, follows commands vaguely  Psych:   Moderate insight, oriented to person, place    Patient Instructions: Current Discharge Medication List      START taking these medications    Details   oseltamivir (TAMIFLU) 75 mg capsule Take 1 Cap by mouth every twelve (12) hours for 3 days. Qty: 6 Cap, Refills: 0         CONTINUE these medications which have NOT CHANGED    Details   carvedilol (COREG) 3.125 mg tablet Take 3.125 mg by mouth two (2) times daily (with meals). atorvastatin (LIPITOR) 40 mg tablet Take 40 mg by mouth nightly. lisinopril (PRINIVIL, ZESTRIL) 2.5 mg tablet Take 2.5 mg by mouth daily. metFORMIN (GLUCOPHAGE) 1,000 mg tablet Take 1,000 mg by mouth two (2) times daily (with meals). !! memantine (NAMENDA) 10 mg tablet Take 10 mg by mouth two (2) times a day. Starting 1/30/18      potassium chloride SR (KLOR-CON 10) 10 mEq tablet Take 10 mEq by mouth daily (with breakfast). rivaroxaban (XARELTO) 20 mg tab tablet Take 20 mg by mouth daily (with breakfast). insulin degludec (TRESIBA FLEXTOUCH U-100) 100 unit/mL (3 mL) inpn 14 Units by SubCUTAneous route nightly. diclofenac (VOLTAREN) 1 % gel Apply 2 g to affected area daily. To right leg and right hip      ondansetron hcl (ZOFRAN) 4 mg tablet Take 4 mg by mouth every four (4) hours as needed for Nausea. !! memantine (NAMENDA) 10 mg tablet Take 10 mg by mouth daily. 10 mg q AM, 5 mg q PM from 1/23-1/29 then increase to 10 mg po BID on 1/30      !! memantine (NAMENDA) 5 mg tablet Take 5 mg by mouth nightly. 10 mg q AM, 5 mg q PM from 1/23-1/29 then increase to 10 mg po BID on 1/30       ! ! - Potential duplicate medications found. Please discuss with provider. STOP taking these medications       scopolamine (TRANSDERM-SCOP) 1 mg pt3d Comments:   Reason for Stopping:             Activity: Activity as tolerated and PT/OT Eval and Treat  Diet: Regular Diet and Comfort feeding  Wound Care: None needed    Follow-up with your PCP in a few weeks.   Follow-up tests/labs - none    Signed:  Mónica Daniel MD  1/28/2018  7:59 AM

## 2018-01-28 NOTE — PROGRESS NOTES
CM consult to return patient to [facility] today. Called the facility and spoke with the nursing staff who confirmed bed space for today. Nursing will call report. Ambulance transportation arranged through [facility],  is scheduled for 1:00pm. CM called patients brother and informed him of plan for discharge today.

## 2018-01-28 NOTE — PROGRESS NOTES
Problem: Falls - Risk of  Goal: *Absence of Falls  Document Ivone Fall Risk and appropriate interventions in the flowsheet.    Outcome: Progressing Towards Goal  Fall Risk Interventions:  Mobility Interventions: Bed/chair exit alarm, Communicate number of staff needed for ambulation/transfer, Patient to call before getting OOB    Mentation Interventions: Adequate sleep, hydration, pain control, Bed/chair exit alarm, Door open when patient unattended, More frequent rounding, Reorient patient, Room close to nurse's station, Toileting rounds, Update white board    Medication Interventions: Bed/chair exit alarm, Evaluate medications/consider consulting pharmacy, Patient to call before getting OOB    Elimination Interventions: Bed/chair exit alarm, Call light in reach

## 2018-01-28 NOTE — DISCHARGE INSTRUCTIONS
Patient Discharge Instructions    Jamilah Alba / [id number] : 1950    Admitted 2018 Discharged: 2018     Primary Diagnoses  Problem List as of 2018  Date Reviewed: 2018          Codes Class Noted - Resolved   Seasonal allergies   Hypertension   Hx of deep venous thrombosis   DM type 2 causing vascular disease ([facility]   Depression   Dementia   Systolic and diastolic CHF, chronic (HCC)   * (Principal)Influenza   Type 2 diabetes mellitus without complication (HCC) (Chronic)   Paroxysmal atrial fibrillation (HCC)   Hyperlipidemia (Chronic)   COPD (chronic obstructive pulmonary disease) (HCC) (Chronic)   GERD (gastroesophageal reflux disease) (Chronic)   CAM (obstructive sleep apnea) (Chronic)   Hx of completed stroke          Take Home Medications      · It is important that you take the medication exactly as they are prescribed. · Keep your medication in the bottles provided by the pharmacist and keep a list of the medication names, dosages, and times to be taken in your wallet. · Do not take other medications without consulting your doctor. What to do at Home    Recommended diet: Regular Diet and Comfort feeding    Recommended activity: Activity as tolerated and PT/OT Eval and Treat    If you experience worse symptoms, please follow up with your PCP. Follow-up with your PCP in a few weeks      Influenza (Flu): Care Instructions  Your Care Instructions    Influenza (flu) is an infection in the lungs and breathing passages. It is caused by the influenza virus. There are different strains, or types, of the flu virus from year to year. Unlike the common cold, the flu comes on suddenly and the symptoms, such as a cough, congestion, fever, chills, fatigue, aches, and pains, are more severe. These symptoms may last up to 10 days. Although the flu can make you feel very sick, it usually doesn't cause serious health problems. Home treatment is usually all you need for flu symptoms. But your doctor may prescribe antiviral medicine to prevent other health problems, such as pneumonia, from developing. Older people and those who have a long-term health condition, such as lung disease, are most at risk for having pneumonia or other health problems. Follow-up care is a key part of your treatment and safety. Be sure to make and go to all appointments, and call your doctor if you are having problems. It's also a good idea to know your test results and keep a list of the medicines you take. How can you care for yourself at home? · Get plenty of rest.  · Drink plenty of fluids, enough so that your urine is light yellow or clear like water. If you have kidney, heart, or liver disease and have to limit fluids, talk with your doctor before you increase the amount of fluids you drink. · Take an over-the-counter pain medicine if needed, such as acetaminophen (Tylenol), ibuprofen (Advil, Motrin), or naproxen (Aleve), to relieve fever, headache, and muscle aches. Read and follow all instructions on the label. No one younger than 20 should take aspirin. It has been linked to Reye syndrome, a serious illness. · Do not smoke. Smoking can make the flu worse. If you need help quitting, talk to your doctor about stop-smoking programs and medicines. These can increase your chances of quitting for good. · Breathe moist air from a hot shower or from a sink filled with hot water to help clear a stuffy nose. · Before you use cough and cold medicines, check the label. These medicines may not be safe for young children or for people with certain health problems. · If the skin around your nose and lips becomes sore, put some petroleum jelly on the area. · To ease coughing:  ¨ Drink fluids to soothe a scratchy throat. ¨ Suck on cough drops or plain hard candy. ¨ Take an over-the-counter cough medicine that contains dextromethorphan to help you get some sleep. Read and follow all instructions on the label.   ¨ Raise your head at night with an extra pillow. This may help you rest if coughing keeps you awake. · Take any prescribed medicine exactly as directed. Call your doctor if you think you are having a problem with your medicine. To avoid spreading the flu  · Wash your hands regularly, and keep your hands away from your face. · Stay home from school, work, and other public places until you are feeling better and your fever has been gone for at least 24 hours. The fever needs to have gone away on its own without the help of medicine. · Ask people living with you to talk to their doctors about preventing the flu. They may get antiviral medicine to keep from getting the flu from you. · To prevent the flu in the future, get a flu vaccine every fall. Encourage people living with you to get the vaccine. · Cover your mouth when you cough or sneeze. When should you call for help? Call 911 anytime you think you may need emergency care. For example, call if:  ? · You have severe trouble breathing. ?Call your doctor now or seek immediate medical care if:  ? · You have new or worse trouble breathing. ? · You seem to be getting much sicker. ? · You feel very sleepy or confused. ? · You have a new or higher fever. ? · You get a new rash. ? Watch closely for changes in your health, and be sure to contact your doctor if:  ? · You begin to get better and then get worse. ? · You are not getting better after 1 week. Where can you learn more? Go to http://keith-monica.info/. Enter D785 in the search box to learn more about \"Influenza (Flu): Care Instructions. \"  Current as of: May 12, 2017  Content Version: 11.4  © 9704-7437 [employer]. Care instructions adapted under license by [employer] (which disclaims liability or warranty for this information).  If you have questions about a medical condition or this instruction, always ask your healthcare professional. [employer] disclaims any warranty or liability for your use of this information. Information obtained by :  I understand that if any problems occur once I am at home I am to contact my physician. I understand and acknowledge receipt of the instructions indicated above.                                                                                                                                            Physician's or R.N.'s Signature                                                                  Date/Time                                                                                                                                              Patient or Representative Signature                                                          Date/Time

## 2018-01-28 NOTE — PROGRESS NOTES
[facility] [place] [facility] [place] [facility]  [address] [facility], [address] [place] [address]  [phone]      Medical Progress Note      NAME: Efra Gaffney   :  1950  MRM:  [id number]    Date/Time: 2018  8:31 AM       Assessment and Plan:     Influenza - POA, we had feared high risk of deterioration due to underlying conditions, but she appears better. Complete Tamiflu. Droplet precautions. Sepsis / Leukocytosis / Sinus tachycardia - POA due to Flu. She did not have end organ damage, thus NOT severe sepsis. Doubt any bacterial infection. No Abx. Hydrated carefully, full sepsis bolus could be harmful. DM type 2 causing vascular disease - BG elevated initially due to illness, now better. Diabetic diet and counseling. SSI per protocol. Continue home Lantus. Check A1c. Systolic and diastolic CHF, chronic / Hypotension / Hypertension - Hx of CHF with EF 98% and diastolic dysfunction. Watch fluid carefully as we hydrate for sepsis. Consulted cards to co manage, but patient and family want no further workup. High risk of death, yet she remains full code per our records. Continue coreg, hold lisinopril. Palliative consult with family about goals of care. Dementia / Hx of completed stroke / Depression - Fall and aspiration precautions. PT/OT/Speech/Palliative consults. Ready to return to rehab if they can take her. Continue memantine    Anemia - Moderate, and will worsen with hydration. Check serologies. Likely chronic disease. I would recommend PCP stop xarelto    Hypomagnesemia - Replete and follow. COPD (chronic obstructive pulmonary disease) - Not on any meds. I am not sure about this Dx. CAM (obstructive sleep apnea) - Does not use CPAP    Hx of deep venous thrombosis - Hx bilateral, has IVC filter, held xarelto, PCP to consider stopping it. Paroxysmal atrial fibrillation - High fall risk and anemia. Currently NSR. PCP to consider stopping xarelto.     GERD (gastroesophageal reflux disease) - PPI if symptoms. Hyperlipidemia - She is on Lipitor. Given overall poor condition, PCP may want to stop that. Subjective:     Chief Complaint:  Comfortable, more alert    ROS:  (bold if positive, if negative)      Tolerating some PT  Tolerating some Diet        Objective:     Last 24hrs VS reviewed since prior progress note. Most recent are:    Visit Vitals    /64 (BP 1 Location: Right arm, BP Patient Position: At rest)    Pulse 66    Temp 98.1 °F (36.7 °C)    Resp 17    Ht 5' 4\" (1.626 m)    Wt 60 kg (132 lb 4.4 oz)    SpO2 99%    BMI 22.71 kg/m2     SpO2 Readings from Last 6 Encounters:   01/28/18 99%   03/05/15 99%            Intake/Output Summary (Last 24 hours) at 01/28/18 0750  Last data filed at 01/28/18 0659   Gross per 24 hour   Intake              840 ml   Output                0 ml   Net              840 ml        Physical Exam:    Gen:  Frail, in no acute distress  HEENT:  Pink conjunctivae, PERRL, hearing intact to voice, moist mucous membranes  Neck:  Supple, without masses, thyroid non-tender  Resp:  No accessory muscle use, clear breath sounds without wheezes rales or rhonchi  Card:  No murmurs, tachycardic S1, S2 without thrills, bruits or peripheral edema  Abd:  Soft, non-tender, non-distended, normoactive bowel sounds are present, no mass  Lymph:  No cervical or inguinal adenopathy  Musc:  No cyanosis or clubbing  Skin:  No rashes or ulcers, skin turgor is reduced, normal pulses and adequate cap refill  Neuro:  Cranial nerves are grossly intact, general motor weakness, follows commands vaguely  Psych:   Moderate insight, oriented to person, place    Telemetry reviewed:   normal sinus rhythm  __________________________________________________________________  Medications Reviewed: (see below)  Medications:     Current Facility-Administered Medications   Medication Dose Route Frequency    0.9% sodium chloride with KCl 40 mEq/L infusion IntraVENous CONTINUOUS    oseltamivir (TAMIFLU) capsule 75 mg  75 mg Oral Q12H    sodium chloride (NS) flush 5-10 mL  5-10 mL IntraVENous Q8H    sodium chloride (NS) flush 5-10 mL  5-10 mL IntraVENous PRN    acetaminophen (TYLENOL) tablet 650 mg  650 mg Oral Q4H PRN    prochlorperazine (COMPAZINE) injection 10 mg  10 mg IntraVENous Q6H PRN    atorvastatin (LIPITOR) tablet 40 mg  40 mg Oral QHS    carvedilol (COREG) tablet 3.125 mg  3.125 mg Oral BID WITH MEALS    insulin glargine (LANTUS) injection 10 Units  10 Units SubCUTAneous QHS    [START ON 1/30/2018] memantine (NAMENDA) tablet 10 mg  10 mg Oral BID    memantine (NAMENDA) tablet 10 mg  10 mg Oral DAILY    memantine (NAMENDA) tablet 5 mg  5 mg Oral QHS        Lab Data Reviewed: (see below)  Lab Review:     Recent Labs      01/28/18   0348  01/27/18   0218  01/26/18   1453   WBC  5.6  9.6  11.7*   HGB  9.1*  9.7*  10.6*   HCT  29.0*  31.1*  32.9*   PLT  194  220  236     Recent Labs      01/28/18   0354  01/27/18   0218  01/26/18   1453   NA  141  138  136   K  4.2  3.6  4.8   CL  107  105  101   CO2  27  26  26   GLU  105*  145*  273*   BUN  7  9  10   CREA  0.55  0.58  0.56   CA  8.0*  8.3*  8.9   MG  2.1  1.4*   --    PHOS   --   2.4*   --    ALB   --    --   3.1*   TBILI   --    --   0.6   SGOT   --    --   31   ALT   --    --   20     Lab Results   Component Value Date/Time    Glucose (POC) 97 01/28/2018 07:04 AM    Glucose (POC) 135 01/27/2018 09:28 PM    Glucose (POC) 113 01/27/2018 04:01 PM    Glucose (POC) 144 01/27/2018 11:09 AM    Glucose (POC) 144 01/27/2018 07:24 AM     No results for input(s): PH, PCO2, PO2, HCO3, FIO2 in the last 72 hours. No results for input(s): INR in the last 72 hours.     No lab exists for component: Diana Gaffney  All Micro Results     Procedure Component Value Units Date/Time    URINE CULTURE HOLD SAMPLE [[id number]] Collected:  01/26/18 1702    Order Status:  Completed Specimen:  Urine from Serum Updated: 01/26/18 1708     Urine culture hold         URINE ON HOLD IN MICROBIOLOGY DEPT FOR 3 DAYS    INFLUENZA A & B AG (RAPID TEST) [[id number]]  (Abnormal) Collected:  01/26/18 1453    Order Status:  Completed Specimen:  Nasopharyngeal from Nasal washing Updated:  01/26/18 1544     Influenza A Antigen POSITIVE (A)        Influenza B Antigen NEGATIVE              I have reviewed notes of prior 24hr.     Other pertinent lab: none    Total time spent with patient: [address] [facility] discussed with: Patient, Care Manager, Nursing Staff and >50% of time spent in counseling and coordination of care    Discussed:  Care Plan    Prophylaxis:  H2B/PPI    Disposition:  SNF/LTC           ___________________________________________________    Attending Physician: Mónica Daniel MD

## 2018-01-28 NOTE — PROGRESS NOTES
Bedside and Verbal shift change report given to SERGEI Eastman(oncoming nurse) by Jo MARTE RN(offgoing nurse). Report included the following information SBAR and Kardex.

## 2019-02-21 ENCOUNTER — APPOINTMENT (OUTPATIENT)
Dept: CT IMAGING | Age: 69
End: 2019-02-21
Attending: EMERGENCY MEDICINE
Payer: MEDICARE

## 2019-02-21 ENCOUNTER — HOSPITAL ENCOUNTER (EMERGENCY)
Age: 69
Discharge: HOME | End: 2019-02-21
Attending: EMERGENCY MEDICINE
Payer: MEDICARE

## 2019-02-21 VITALS
BODY MASS INDEX: 22.2 KG/M2 | TEMPERATURE: 98.3 F | WEIGHT: 130 LBS | DIASTOLIC BLOOD PRESSURE: 49 MMHG | RESPIRATION RATE: 16 BRPM | OXYGEN SATURATION: 100 % | SYSTOLIC BLOOD PRESSURE: 111 MMHG | HEIGHT: 64 IN | HEART RATE: 67 BPM

## 2019-02-21 DIAGNOSIS — R11.2 NAUSEA AND VOMITING, INTRACTABILITY OF VOMITING NOT SPECIFIED, UNSPECIFIED VOMITING TYPE: Primary | ICD-10-CM

## 2019-02-21 LAB
ALBUMIN SERPL-MCNC: 2.8 G/DL (ref 3.5–5)
ALBUMIN/GLOB SERPL: 0.8 {RATIO} (ref 1.1–2.2)
ALP SERPL-CCNC: 63 U/L (ref 45–117)
ALT SERPL-CCNC: 14 U/L (ref 12–78)
ANION GAP SERPL CALC-SCNC: 8 MMOL/L (ref 5–15)
AST SERPL-CCNC: 14 U/L (ref 15–37)
BASOPHILS # BLD: 0 K/UL (ref 0–0.1)
BASOPHILS NFR BLD: 0 % (ref 0–1)
BILIRUB SERPL-MCNC: 0.3 MG/DL (ref 0.2–1)
BUN SERPL-MCNC: 18 MG/DL (ref 6–20)
BUN/CREAT SERPL: 24 (ref 12–20)
CALCIUM SERPL-MCNC: 8.3 MG/DL (ref 8.5–10.1)
CHLORIDE SERPL-SCNC: 108 MMOL/L (ref 97–108)
CO2 SERPL-SCNC: 29 MMOL/L (ref 21–32)
COMMENT, HOLDF: NORMAL
CREAT SERPL-MCNC: 0.76 MG/DL (ref 0.55–1.02)
DIFFERENTIAL METHOD BLD: ABNORMAL
EOSINOPHIL # BLD: 0.3 K/UL (ref 0–0.4)
EOSINOPHIL NFR BLD: 3 % (ref 0–7)
ERYTHROCYTE [DISTWIDTH] IN BLOOD BY AUTOMATED COUNT: 15.6 % (ref 11.5–14.5)
GLOBULIN SER CALC-MCNC: 3.7 G/DL (ref 2–4)
GLUCOSE SERPL-MCNC: 88 MG/DL (ref 65–100)
HCT VFR BLD AUTO: 31.9 % (ref 35–47)
HEMOCCULT STL QL: NEGATIVE
HGB BLD-MCNC: 9.6 G/DL (ref 11.5–16)
IMM GRANULOCYTES # BLD AUTO: 0 K/UL (ref 0–0.04)
IMM GRANULOCYTES NFR BLD AUTO: 0 % (ref 0–0.5)
LACTATE BLD-SCNC: 1.08 MMOL/L (ref 0.4–2)
LIPASE SERPL-CCNC: 84 U/L (ref 73–393)
LYMPHOCYTES # BLD: 3 K/UL (ref 0.8–3.5)
LYMPHOCYTES NFR BLD: 30 % (ref 12–49)
MCH RBC QN AUTO: 28 PG (ref 26–34)
MCHC RBC AUTO-ENTMCNC: 30.1 G/DL (ref 30–36.5)
MCV RBC AUTO: 93 FL (ref 80–99)
MONOCYTES # BLD: 0.7 K/UL (ref 0–1)
MONOCYTES NFR BLD: 7 % (ref 5–13)
NEUTS SEG # BLD: 6.1 K/UL (ref 1.8–8)
NEUTS SEG NFR BLD: 60 % (ref 32–75)
NRBC # BLD: 0 K/UL (ref 0–0.01)
NRBC BLD-RTO: 0 PER 100 WBC
PLATELET # BLD AUTO: 369 K/UL (ref 150–400)
PMV BLD AUTO: 9.4 FL (ref 8.9–12.9)
POTASSIUM SERPL-SCNC: 4.3 MMOL/L (ref 3.5–5.1)
PROT SERPL-MCNC: 6.5 G/DL (ref 6.4–8.2)
RBC # BLD AUTO: 3.43 M/UL (ref 3.8–5.2)
SAMPLES BEING HELD,HOLD: NORMAL
SODIUM SERPL-SCNC: 145 MMOL/L (ref 136–145)
TROPONIN I SERPL-MCNC: 0.05 NG/ML
TROPONIN I SERPL-MCNC: <0.05 NG/ML
WBC # BLD AUTO: 10.2 K/UL (ref 3.6–11)

## 2019-02-21 PROCEDURE — 82272 OCCULT BLD FECES 1-3 TESTS: CPT

## 2019-02-21 PROCEDURE — 83605 ASSAY OF LACTIC ACID: CPT

## 2019-02-21 PROCEDURE — 80053 COMPREHEN METABOLIC PANEL: CPT

## 2019-02-21 PROCEDURE — 74011250636 HC RX REV CODE- 250/636: Performed by: EMERGENCY MEDICINE

## 2019-02-21 PROCEDURE — 85025 COMPLETE CBC W/AUTO DIFF WBC: CPT

## 2019-02-21 PROCEDURE — 93005 ELECTROCARDIOGRAM TRACING: CPT

## 2019-02-21 PROCEDURE — 36415 COLL VENOUS BLD VENIPUNCTURE: CPT

## 2019-02-21 PROCEDURE — 99285 EMERGENCY DEPT VISIT HI MDM: CPT

## 2019-02-21 PROCEDURE — 70450 CT HEAD/BRAIN W/O DYE: CPT

## 2019-02-21 PROCEDURE — 96360 HYDRATION IV INFUSION INIT: CPT

## 2019-02-21 PROCEDURE — 83690 ASSAY OF LIPASE: CPT

## 2019-02-21 PROCEDURE — 84484 ASSAY OF TROPONIN QUANT: CPT

## 2019-02-21 RX ORDER — ONDANSETRON 4 MG/1
4 TABLET, ORALLY DISINTEGRATING ORAL
Qty: 10 TAB | Refills: 0 | Status: SHIPPED | OUTPATIENT
Start: 2019-02-21

## 2019-02-21 RX ADMIN — SODIUM CHLORIDE 250 ML: 900 INJECTION, SOLUTION INTRAVENOUS at 07:52

## 2019-02-21 NOTE — ED PROVIDER NOTES
81 y.o. female with past medical history significant for Depression, HTN, CAM, h/o completed stroke, h/o DVT, DMT2, Paroxysmal A-fib, COPD, Dementia, and CHF who presents from [facility] via EMS with chief complaint of vomiting. Pt reports onset \"last night\" of vomiting accompanied by nausea. Pt reports four episodes of vomiting since onset. Although not reported by pt, triage note states pt was \"vomiting blood\" and nursing home staff \"noticed dark stool\". Pt also notes allergies to Cipro. Pt reports history of L rotator cuff issues. Pt denies any recent ill contacts. Pt denies any chest pain, abdominal pain, diarrhea, fever or chills. Currently, pt denies any nausea or pain. Pt exhibits slurred speech at baseline. There are no other acute medical concerns at this time. Social hx: Non smoker; No EtOH use Note written by Yanira Gautam, as dictated by Camelia Del Real MD 7:07 AM  
 
 
 
The history is provided by the patient and medical records. No  was used. Past Medical History:  
Diagnosis Date  COPD (chronic obstructive pulmonary disease) (HCC)  Dementia  Depression  DM type 2 causing vascular disease ([address]  Hx of completed stroke 1992  Hx of deep venous thrombosis   
 bilateral, IVC filter  Hypertension  Incontinence  CAM (obstructive sleep apnea)   
 not on CPAP  
 Paroxysmal atrial fibrillation ([facility] 2015  Seasonal allergies  Systolic and diastolic CHF, chronic ([facility] Past Surgical History:  
Procedure Laterality Date  HX HEART CATHETERIZATION Family History:  
Family history unknown: Yes  
 
 
Social History Socioeconomic History  Marital status:  Spouse name: Not on file  Number of children: Not on file  Years of education: Not on file  Highest education level: Not on file Social Needs  Financial resource strain: Not on file  Food insecurity - worry: Not on file  Food insecurity - inability: Not on file  Transportation needs - medical: Not on file  Transportation needs - non-medical: Not on file Occupational History  Not on file Tobacco Use  Smoking status: Never Smoker  Smokeless tobacco: Never Used Substance and Sexual Activity  Alcohol use: No  
 Drug use: No  
 Sexual activity: Not on file Other Topics Concern  Not on file Social History Narrative  Not on file ALLERGIES: Ciprofloxacin and Cleocin [clindamycin hcl] Review of Systems Constitutional: Negative for chills and fever. HENT: Negative for rhinorrhea and sore throat. Respiratory: Negative for cough and shortness of breath. Cardiovascular: Negative for chest pain. Gastrointestinal: Positive for nausea and vomiting. Negative for abdominal pain and diarrhea. Genitourinary: Negative for dysuria and urgency. Musculoskeletal: Negative for arthralgias and back pain. Skin: Negative for rash. Neurological: Negative for dizziness, weakness and light-headedness. All other systems reviewed and are negative. Vitals:  
 02/21/19 0646 02/21/19 0700 BP: 92/52 96/46 Pulse: 68 60 Resp: 15 16 Temp: 98.9 °F (37.2 °C) SpO2: 100% 100% Weight: 59 kg (130 lb) Height: 5' 4\" (1.626 m) Physical Exam  
Vital signs reviewed. Nursing notes reviewed. Const:  No acute distress, well developed, well nourished Head:  Atraumatic, normocephalic Eyes:  PERRL, conjunctiva normal, no scleral icterus Neck:  Supple, trachea midline Cardiovascular:  RRR, no murmurs, no gallops, no rubs Resp:  No resp distress, no increased work of breathing, no wheezes, no rhonchi, no rales, Abd:  Soft, non-tender, non-distended, no rebound, no guarding, no CVA tenderness : Pt exhibits no external hemorrhoids, brown stool, and no gross blood MSK:  No pedal edema, normal ROM Neuro:  Awake and alert, pt exhibits slurred speech, no cranial nerve defect Skin:  Warm, dry, intact Psych: normal mood and affect, behavior is normal, judgement and thought content is normal 
 
Note written by Yanira Quiñones, as dictated by Yakov Kumar MD 7:07 AM  
 
MDM Number of Diagnoses or Management Options Nausea and vomiting, intractability of vomiting not specified, unspecified vomiting type:  
  
Amount and/or Complexity of Data Reviewed Clinical lab tests: ordered and reviewed Tests in the radiology section of CPT®: ordered and reviewed Review and summarize past medical records: yes Patient Progress Patient progress: stable Pt. Presents to the ER with vomiting. Pt. Has dementia and is not a good historian. Per report, pt. Had blood in her vomit and stool. Pt's stool was brown and heme negative in the ER. Pt. Has not vomited in the ER. She was able to tolerate PO without difficulty. I will start her on zofran. Pt. To f/u with her PCP or return to the ER with worsening sx. Procedures

## 2019-02-21 NOTE — ED NOTES
POA reported abn UA, on AB which upsets her stomach. No hx of bleeding but on xarelto. Call if admitted, [phone], Ms. Francis Meño

## 2019-02-21 NOTE — DISCHARGE INSTRUCTIONS
Patient Education        Nausea and Vomiting: Care Instructions  Your Care Instructions    When you are nauseated, you may feel weak and sweaty and notice a lot of saliva in your mouth. Nausea often leads to vomiting. Most of the time you do not need to worry about nausea and vomiting, but they can be signs of other illnesses. Two common causes of nausea and vomiting are stomach flu and food poisoning. Nausea and vomiting from viral stomach flu will usually start to improve within 24 hours. Nausea and vomiting from food poisoning may last from 12 to 48 hours. The doctor has checked you carefully, but problems can develop later. If you notice any problems or new symptoms, get medical treatment right away. Follow-up care is a key part of your treatment and safety. Be sure to make and go to all appointments, and call your doctor if you are having problems. It's also a good idea to know your test results and keep a list of the medicines you take. How can you care for yourself at home? · To prevent dehydration, drink plenty of fluids, enough so that your urine is light yellow or clear like water. Choose water and other caffeine-free clear liquids until you feel better. If you have kidney, heart, or liver disease and have to limit fluids, talk with your doctor before you increase the amount of fluids you drink. · Rest in bed until you feel better. · When you are able to eat, try clear soups, mild foods, and liquids until all symptoms are gone for 12 to 48 hours. Other good choices include dry toast, crackers, cooked cereal, and gelatin dessert, such as Jell-O. When should you call for help? Call 911 anytime you think you may need emergency care. For example, call if:    · You passed out (lost consciousness).    Call your doctor now or seek immediate medical care if:    · You have symptoms of dehydration, such as:  ? Dry eyes and a dry mouth. ? Passing only a little dark urine. ?  Feeling thirstier than usual.     · You have new or worsening belly pain.     · You have a new or higher fever.     · You vomit blood or what looks like coffee grounds.    Watch closely for changes in your health, and be sure to contact your doctor if:    · You have ongoing nausea and vomiting.     · Your vomiting is getting worse.     · Your vomiting lasts longer than 2 days.     · You are not getting better as expected. Where can you learn more? Go to http://keith-monica.info/. Enter 25 [id number] in the search box to learn more about \"Nausea and Vomiting: Care Instructions. \"  Current as of: September 23, 2018  Content Version: 11.9  © 5091-6076 [employer]. Care instructions adapted under license by [employer] (which disclaims liability or warranty for this information). If you have questions about a medical condition or this instruction, always ask your healthcare professional. [address] any warranty or liability for your use of this information.

## 2019-02-21 NOTE — ED TRIAGE NOTES
Pt arrives with report of N/V. Staff [facility] stated she was vomiting blood and noticed dark stool, pressure was low at facility.

## 2019-02-21 NOTE — ED NOTES
Bedside and Verbal shift change report given to radha smiley (oncoming nurse) by Bud El (offgoing nurse). Report included the following information SBAR.

## 2019-02-21 NOTE — ED NOTES
.Verbal shift change report given to Roman Rosario (oncoming nurse) by Yari Valentin (offgoing nurse). Report included the following information SBAR, MAR and Recent Results.

## 2019-02-21 NOTE — ED NOTES
Discharge instructions given to [facility] All questions answered and pt verbalized understanding. V/S stable @ time of discharge. No lines or drains in place. Pt left via [facility] transportation.

## 2019-02-22 ENCOUNTER — PATIENT OUTREACH (OUTPATIENT)
Dept: FAMILY MEDICINE CLINIC | Age: 69
End: 2019-02-22

## 2019-02-22 LAB
ATRIAL RATE: 56 BPM
CALCULATED R AXIS, ECG10: -46 DEGREES
CALCULATED T AXIS, ECG11: 99 DEGREES
DIAGNOSIS, 93000: NORMAL
Q-T INTERVAL, ECG07: 458 MS
QRS DURATION, ECG06: 104 MS
QTC CALCULATION (BEZET), ECG08: 441 MS
VENTRICULAR RATE, ECG03: 56 BPM

## 2019-02-22 NOTE — PROGRESS NOTES
ED Discharge Follow-Up Date/Time:     2/22/2019 2:36 PM 
 
Patient presented to [facility]  ED on 2/21/19 and was diagnosed with nausea and vomiting. This NN attempted and was unable to reach pt.

## 2019-03-26 ENCOUNTER — TELEPHONE (OUTPATIENT)
Dept: FAMILY MEDICINE CLINIC | Age: 69
End: 2019-03-26

## 2019-03-26 NOTE — TELEPHONE ENCOUNTER
Patricia Doll with [address] calling to ask if Dr. Julee Cazares will be willing to sign death certificate?     Call [phone]

## 2019-03-26 NOTE — TELEPHONE ENCOUNTER
I will complete the death certificate. Please have them deliver it to the office. I will be out of the office until tomorrow morning.

## 2019-03-28 NOTE — TELEPHONE ENCOUNTER
Dr. Melvin Beard   Received: Today   Message Contents   [place], [address] with ([facility]) is requesting a call back regarding if the practice signed the death certificate. Best contact is [phone].

## 2021-07-13 NOTE — ED NOTES
Bedside and Verbal shift change report given to Barb (oncoming nurse) by Luiza Christian (offgoing nurse). Report included the following information SBAR, Kardex, ED Summary and Med Rec Status. Medicare non-coverage form signed on 7-12-21.  Planned discharge date on form: 7-14-21